# Patient Record
Sex: FEMALE | Race: WHITE | Employment: UNEMPLOYED | URBAN - METROPOLITAN AREA
[De-identification: names, ages, dates, MRNs, and addresses within clinical notes are randomized per-mention and may not be internally consistent; named-entity substitution may affect disease eponyms.]

---

## 2019-06-20 PROBLEM — S82.831B: Status: ACTIVE | Noted: 2019-06-20

## 2019-06-20 PROBLEM — S82.101B: Status: ACTIVE | Noted: 2019-06-20

## 2019-06-20 PROBLEM — S82.401B OPEN FRACTURE OF RIGHT TIBIA AND FIBULA: Status: ACTIVE | Noted: 2019-06-20

## 2019-06-20 PROBLEM — S82.201B OPEN FRACTURE OF RIGHT TIBIA AND FIBULA: Status: ACTIVE | Noted: 2019-06-20

## 2019-06-21 PROBLEM — S82.201B OPEN FRACTURE OF RIGHT FIBULA AND TIBIA: Status: ACTIVE | Noted: 2019-06-20

## 2019-06-21 PROBLEM — S82.401B OPEN FRACTURE OF RIGHT FIBULA AND TIBIA: Status: ACTIVE | Noted: 2019-06-20

## 2021-01-15 ENCOUNTER — TELEPHONE (OUTPATIENT)
Age: 51
End: 2021-01-15

## 2021-01-15 NOTE — TELEPHONE ENCOUNTER
Left message on voicemail reminding patient of appointment on 1/18/21 @ 1:15pm if you have any questions plz contact our office

## 2021-01-18 ENCOUNTER — HOSPITAL ENCOUNTER (OUTPATIENT)
Dept: INFUSION THERAPY | Age: 51
Discharge: HOME | End: 2021-01-18
Payer: COMMERCIAL

## 2021-01-18 ENCOUNTER — OFFICE VISIT (OUTPATIENT)
Age: 51
End: 2021-01-18
Payer: COMMERCIAL

## 2021-01-18 VITALS
SYSTOLIC BLOOD PRESSURE: 125 MMHG | HEIGHT: 63 IN | DIASTOLIC BLOOD PRESSURE: 86 MMHG | TEMPERATURE: 98.2 F | RESPIRATION RATE: 14 BRPM | OXYGEN SATURATION: 96 % | HEART RATE: 90 BPM | WEIGHT: 191 LBS | BODY MASS INDEX: 33.84 KG/M2

## 2021-01-18 VITALS
HEART RATE: 90 BPM | OXYGEN SATURATION: 96 % | SYSTOLIC BLOOD PRESSURE: 125 MMHG | DIASTOLIC BLOOD PRESSURE: 86 MMHG | RESPIRATION RATE: 14 BRPM | TEMPERATURE: 98.2 F

## 2021-01-18 DIAGNOSIS — D72.9 NEUTROPHILIA: ICD-10-CM

## 2021-01-18 DIAGNOSIS — D72.9 NEUTROPHILIA: Primary | ICD-10-CM

## 2021-01-18 DIAGNOSIS — Z72.0 TOBACCO ABUSE: ICD-10-CM

## 2021-01-18 LAB
ALBUMIN SERPL-MCNC: 4 G/DL (ref 3.4–5)
ALBUMIN/GLOB SERPL: 1.1 {RATIO} (ref 0.8–1.7)
ALP SERPL-CCNC: 206 U/L (ref 45–117)
ALT SERPL-CCNC: 29 U/L (ref 13–56)
ANION GAP SERPL CALC-SCNC: 5 MMOL/L (ref 3–18)
AST SERPL-CCNC: 11 U/L (ref 10–38)
BASOPHILS # BLD: 0 K/UL (ref 0–0.1)
BASOPHILS NFR BLD: 0 % (ref 0–2)
BILIRUB SERPL-MCNC: 0.3 MG/DL (ref 0.2–1)
BUN SERPL-MCNC: 18 MG/DL (ref 7–18)
BUN/CREAT SERPL: 24 (ref 12–20)
CALCIUM SERPL-MCNC: 9.8 MG/DL (ref 8.5–10.1)
CHLORIDE SERPL-SCNC: 105 MMOL/L (ref 100–111)
CO2 SERPL-SCNC: 29 MMOL/L (ref 21–32)
CREAT SERPL-MCNC: 0.76 MG/DL (ref 0.6–1.3)
DIFFERENTIAL METHOD BLD: ABNORMAL
EOSINOPHIL # BLD: 0.2 K/UL (ref 0–0.4)
EOSINOPHIL NFR BLD: 1 % (ref 0–5)
ERYTHROCYTE [DISTWIDTH] IN BLOOD BY AUTOMATED COUNT: 13.2 % (ref 11.6–14.5)
FERRITIN SERPL-MCNC: 44 NG/ML (ref 8–388)
FOLATE SERPL-MCNC: 8.4 NG/ML (ref 3.1–17.5)
GLOBULIN SER CALC-MCNC: 3.6 G/DL (ref 2–4)
GLUCOSE SERPL-MCNC: 75 MG/DL (ref 74–99)
HCT VFR BLD AUTO: 47.2 % (ref 35–45)
HGB BLD-MCNC: 14.9 G/DL (ref 12–16)
IRON SATN MFR SERPL: 16 % (ref 20–50)
IRON SERPL-MCNC: 63 UG/DL (ref 50–175)
LYMPHOCYTES # BLD: 2.6 K/UL (ref 0.9–3.6)
LYMPHOCYTES NFR BLD: 24 % (ref 21–52)
MCH RBC QN AUTO: 29.9 PG (ref 24–34)
MCHC RBC AUTO-ENTMCNC: 31.6 G/DL (ref 31–37)
MCV RBC AUTO: 94.8 FL (ref 74–97)
MONOCYTES # BLD: 0.7 K/UL (ref 0.05–1.2)
MONOCYTES NFR BLD: 6 % (ref 3–10)
NEUTS SEG # BLD: 7.4 K/UL (ref 1.8–8)
NEUTS SEG NFR BLD: 69 % (ref 40–73)
PLATELET # BLD AUTO: 438 K/UL (ref 135–420)
PMV BLD AUTO: 10.8 FL (ref 9.2–11.8)
POTASSIUM SERPL-SCNC: 4.5 MMOL/L (ref 3.5–5.5)
PROT SERPL-MCNC: 7.6 G/DL (ref 6.4–8.2)
RBC # BLD AUTO: 4.98 M/UL (ref 4.2–5.3)
SODIUM SERPL-SCNC: 139 MMOL/L (ref 136–145)
TIBC SERPL-MCNC: 400 UG/DL (ref 250–450)
TSH SERPL DL<=0.05 MIU/L-ACNC: 1.53 UIU/ML (ref 0.36–3.74)
VIT B12 SERPL-MCNC: 719 PG/ML (ref 211–911)
WBC # BLD AUTO: 10.8 K/UL (ref 4.6–13.2)

## 2021-01-18 PROCEDURE — 81206 BCR/ABL1 GENE MAJOR BP: CPT

## 2021-01-18 PROCEDURE — 83540 ASSAY OF IRON: CPT

## 2021-01-18 PROCEDURE — 99204 OFFICE O/P NEW MOD 45 MIN: CPT | Performed by: INTERNAL MEDICINE

## 2021-01-18 PROCEDURE — 81270 JAK2 GENE: CPT

## 2021-01-18 PROCEDURE — 84443 ASSAY THYROID STIM HORMONE: CPT

## 2021-01-18 PROCEDURE — 80053 COMPREHEN METABOLIC PANEL: CPT

## 2021-01-18 PROCEDURE — 81219 CALR GENE COM VARIANTS: CPT

## 2021-01-18 PROCEDURE — 82728 ASSAY OF FERRITIN: CPT

## 2021-01-18 PROCEDURE — 36415 COLL VENOUS BLD VENIPUNCTURE: CPT

## 2021-01-18 PROCEDURE — 85025 COMPLETE CBC W/AUTO DIFF WBC: CPT

## 2021-01-18 PROCEDURE — 82746 ASSAY OF FOLIC ACID SERUM: CPT

## 2021-01-18 PROCEDURE — 81402 MOPATH PROCEDURE LEVEL 3: CPT

## 2021-01-18 RX ORDER — SERTRALINE HYDROCHLORIDE 50 MG/1
TABLET, FILM COATED ORAL DAILY
Status: ON HOLD | COMMUNITY
End: 2021-04-13

## 2021-01-18 RX ORDER — SUMATRIPTAN 100 MG/1
100 TABLET, FILM COATED ORAL
COMMUNITY

## 2021-01-18 NOTE — PROGRESS NOTES
[facility]  [address] [place] [address], [address]  [place], [facility]  Office Phone: [phone]  Fax: [phone]      Reason for visit:  Neutrophilia    HPI:   Tima Verdin is a 52 y.o.  female who I was asked to see in consultation at the request of Dr. Ryder Sandy for evaluation for leukocytosis. Labs on 2019 showed WBC 16.4, H&H 13.2/39.6, platelets 302. ANC 13.9. Labs in May 2014 were normal.  Labs on 20 showed WBC 11.2, H&H 14.5/44.4, platelets 475. ANC 7.6  Labs on 10/21/2020 showed WBC 15.5, H&H 14.2/44.7, platelet 795. ANC 14.2  Labs on 12/10/2020 showed WBC 13.1, H&H 14.4/46.4, platelet 980. ANC 8.7, AMC 1.2. I saw and examined patient today. She is awake alert oriented x3. On review of systems she denies any fevers, chills, weight loss, lumps and bumps, or focal neurologic deficit. No drenching night sweats. No repeated infections. Has chronic headache and dizziness. All other point of review of system have been reviewed and were negative. ECOG performance status 0. Independent with ADLs and IADLs. DX   Encounter Diagnosis   Name Primary?    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     GERD (gastroesophageal reflux disease)     reflux    Migraine     Nausea & vomiting     HA    Urinary frequency     Vaginitis and vulvovaginitis     Vitamin D deficiency      Past Surgical History:   Procedure Laterality Date    HX  SECTION      x3    HX ORTHOPAEDIC  2019    bone graft and plate to right fibia, s/p lower leg fracture    HX ORTHOPAEDIC  2019    jodie in her tibia right( both procedures connected)     Social History     Socioeconomic History    Marital status:      Spouse name: Not on file    Number of children: Not on file    Years of education: Not on file    Highest education level: Not on file   Tobacco Use    Smoking status: Current Every Day Smoker     Packs/day: 0.50     Types: Cigarettes    Smokeless tobacco: Never Used    Tobacco comment: less than a half pack per day   Substance and Sexual Activity    Alcohol use: Not Currently     Alcohol/week: 2.0 standard drinks     Types: 2 Glasses of wine per week     Comment: quit in 2006    Drug use: No    Sexual activity: Not Currently     Family History   Problem Relation Age of Onset    Thyroid Disease Mother     High Cholesterol Mother     Cancer Father        Current Outpatient Medications   Medication Sig Dispense Refill    SUMAtriptan (Imitrex) 100 mg tablet Take 100 mg by mouth once as needed for Migraine.  sertraline (Zoloft) 50 mg tablet Take  by mouth daily.  butalbital-aspirin-caffeine (FIORINAL) -40 mg tablet Take 1 Tab by mouth every six (6) hours as needed for Pain (last taken 10/26/20).  gabapentin (NEURONTIN) 300 mg capsule Take 1 Cap by mouth three (3) times daily. Indications: Neuropathic Pain, acute pain following an operation (Patient taking differently: Take 300 mg by mouth two (2) times a day. Indications: neuropathic pain, acute pain following an operation) 90 Cap 0    ibuprofen (MOTRIN) 200 mg tablet Take 600 mg by mouth every eight (8) hours as needed for Pain. Last taken 10/21/20  Indications: pain      ondansetron hcl (ZOFRAN) 4 mg tablet Take 4 mg by mouth every twelve (12) hours as needed for Nausea.  erenumab-aooe (Aimovig Autoinjector) 70 mg/mL injection 70 mg by SubCUTAneous route once. Taken on the 14th of each month      simethicone (MYLICON) 80 mg chewable tablet Take 80 mg by mouth every six (6) hours as needed for Flatulence.  fluticasone (FLONASE) 50 mcg/actuation nasal spray 2 Sprays by Both Nostrils route daily.  1 Bottle 0       No Known Allergies    Review of Systems  As per HPi    Objective:  Physical Exam:  Visit Vitals  /86   Pulse 90   Temp 98.2 °F (36.8 °C) (Oral)   Resp 14   Ht 5' 3\" (1.6 m)   Wt 86.6 kg (191 lb)   SpO2 96%   BMI 33.83 kg/m²       General:  Alert, cooperative, no distress, appears stated age. Head:  Normocephalic, without obvious abnormality, atraumatic. Eyes:  Conjunctivae/corneas clear. PERRL, EOMs intact. Throat: Lips, mucosa, and tongue normal.    Neck: Supple, symmetrical, trachea midline, no adenopathy, thyroid: no enlargement/tenderness/nodules   Back:   Symmetric, no curvature. ROM normal. No CVA tenderness. Lungs:   Clear to auscultation bilaterally. Chest wall:  No tenderness or deformity. Heart:  Regular rate and rhythm, S1, S2 normal, no murmur, click, rub or gallop. Abdomen:   Soft, non-tender. Bowel sounds normal. No masses,  No organomegaly. Extremities: Extremities normal, atraumatic, no cyanosis or edema. Skin: Skin color, texture, turgor normal. No rashes or lesions. Lymph nodes: Cervical, supraclavicular, and axillary nodes normal.   Neurologic: CNII-XII intact. Diagnostic Imaging     Results for orders placed during the hospital encounter of 08/16/20   CT SPINE CERV WO CONT    Narrative Indication: neck pain after head inj. Impression IMPRESSION: No fracture. Comment: CT images of the cervical spine were obtained without intravenous  contrast. This was compared with June 19, 2019. No fracture or dislocation. There is mild reversal the normal cervical curvature. Vertebral body stature  alignment and intervertebral disc space heights are otherwise preserved  throughout. No spinal stenosis or neural foramen encroachment. No prevertebral  soft tissue swelling. DICOM format imaged data is available to non-affiliated external healthcare  facilities or entities on a secure, media free, reciprocally searchable basis  with patient authorization  for 12 months following the date of the study.          Lab Results  Lab Results Component Value Date/Time    WBC 15.5 (H) 10/21/2020 03:00 PM    HGB 14.2 10/21/2020 03:00 PM    HCT 44.7 10/21/2020 03:00 PM    PLATELET 248  03:00 PM    MCV 93.1 10/21/2020 03:00 PM       Lab Results   Component Value Date/Time    Sodium 138 10/21/2020 03:00 PM    Potassium 4.6 10/21/2020 03:00 PM    Chloride 104 10/21/2020 03:00 PM    CO2 28 10/21/2020 03:00 PM    Anion gap 5 10/21/2020 03:00 PM    Glucose 123 (H) 10/21/2020 03:00 PM    BUN 21 10/21/2020 03:00 PM    Creatinine 0.9 10/21/2020 03:00 PM    GFR est AA >60.0 10/21/2020 03:00 PM    GFR est non-AA >60 10/21/2020 03:00 PM    Calcium 9.8 10/21/2020 03:00 PM    Alk. phosphatase 201 (H) 10/21/2020 03:00 PM    Protein, total 7.8 10/21/2020 03:00 PM    Albumin 3.9 10/21/2020 03:00 PM    ALT (SGPT) 49 10/21/2020 03:00 PM     Follow-up with PCP for health maintenance  Assessment/Plan:  52 y.o. female with   1. Neutrophilia  I Had a long discussion with patient regarding her neutrophilia. She has no repeated infections. Not on steroids. She smokes 1/2 cigarette per day. Her father  from acute myeloid leukemia. I told that her neutrophilia is likely reactive from cigarette smoking however I will do w/u and r/o MPN as below:  - CBC WITH AUTOMATED DIFF; Future  - FERRITIN; Future  - IRON PROFILE; Future  - METABOLIC PANEL, COMPREHENSIVE; Future  - VITAMIN B12 & FOLATE; Future  - JAK2 MUTATION ANALYSIS; Future  - MPL MUTATION ANALYSIS; Future  - CALR MUTATION ANALYSIS; Future  - PBS  - TSH    2. Tobacco abuse: Tobacco cessation counseling done. She will talk to PCP to try Tarrs Raw    Thank you  for referring Mrs. Rafa Posadas    CC:      Return in 2 weeks

## 2021-01-18 NOTE — PROGRESS NOTES
[facility] Progress Note    Date: 2021    Name: Marimar Fontaine    MRN: [id number]         : 1970    Peripheral Lab Draw      Ms. Fontaine to [facility], ambulatory at 1525 accompanied by self. Pt was assessed and education was provided.     Ms. Fontaine's vitals were reviewed and patient was observed for 5 minutes prior to treatment.   Visit Vitals  /86   Pulse 90   Temp 98.2 °F (36.8 °C) (Oral)   Resp 14   SpO2 96%       Blood obtained peripherally from left arm x 1 attempt with butterfly needle and sent to lab for Cbc w/diff, Cmp, Iron Profile, Ferritin, Tsh, Peripheral smear, Calr, Mpl and Jak2 Mutation and BCR-abl1,  per written orders. No bleeding or hematoma noted at site. Gauze and coban applied.     Ms. Fontaine tolerated the phlebotomy, and had no complaints.  Patient armband removed and shredded.    [facility] at 1535.     Anjana Patel Phlebotomist PCT  2021  3:33 PM

## 2021-01-18 NOTE — LETTER
1/18/2021 Patient: Mica Arnold YOB: 1970 Date of Visit: 1/18/2021 Hilda Kaba MD 
[address] [facility] [address] [address] Via Fax: [phone] Dear Hilda Kaba MD, Thank you for referring Ms. Mica Arnold to [address] [place] [address] for evaluation. My notes for this consultation are attached. If you have questions, please do not hesitate to call me. I look forward to following your patient along with you.  
 
 
Sincerely, 
 
Billy Cooper MD

## 2021-01-19 LAB — PERIPHERAL SMEAR,PSM: NORMAL

## 2021-01-22 LAB
BACKGROUND, CALR2T: NORMAL
CALR MUTATION DETECTION RESULT, CALR1T: NORMAL
LAB DIRECTOR NAME PROVIDER: NORMAL
REF LAB TEST METHOD: NORMAL
REFERENCES, CALR4T: NORMAL

## 2021-01-25 LAB
BACKGROUND, BCR6T: NORMAL
BACKGROUND: 489207: NORMAL
DIRECTOR REVIEW: 489204: NORMAL
INTERPRETATION: 480488: NEGATIVE
JAK2 P.V617F BLD/T QL: NORMAL
LAB DIRECTOR NAME PROVIDER: NORMAL
T(ABL1,BCR)B2A2/CONTROL BLD/T: NORMAL %
T(ABL1,BCR)B2A2/CONTROL BLD/T: NORMAL %
T(ABL1,BCR)B3A2/CONTROL BLD/T: NORMAL %
T(ABL1,BCR)E1A2/CONTROL BLD/T: NORMAL %

## 2021-01-26 PROBLEM — D50.9 IRON DEFICIENCY ANEMIA: Status: ACTIVE | Noted: 2021-01-26

## 2021-01-26 RX ORDER — ALBUTEROL SULFATE 0.83 MG/ML
2.5 SOLUTION RESPIRATORY (INHALATION) AS NEEDED
Status: CANCELLED
Start: 2021-01-29

## 2021-01-26 RX ORDER — ONDANSETRON 2 MG/ML
8 INJECTION INTRAMUSCULAR; INTRAVENOUS AS NEEDED
Status: CANCELLED | OUTPATIENT
Start: 2021-01-29

## 2021-01-26 RX ORDER — HYDROCORTISONE SODIUM SUCCINATE 100 MG/2ML
100 INJECTION, POWDER, FOR SOLUTION INTRAMUSCULAR; INTRAVENOUS AS NEEDED
Status: CANCELLED | OUTPATIENT
Start: 2021-01-29

## 2021-01-26 RX ORDER — EPINEPHRINE 1 MG/ML
0.3 INJECTION, SOLUTION, CONCENTRATE INTRAVENOUS AS NEEDED
Status: CANCELLED | OUTPATIENT
Start: 2021-01-29

## 2021-01-26 RX ORDER — DIPHENHYDRAMINE HYDROCHLORIDE 50 MG/ML
25 INJECTION, SOLUTION INTRAMUSCULAR; INTRAVENOUS AS NEEDED
Status: CANCELLED
Start: 2021-01-29

## 2021-01-26 RX ORDER — DIPHENHYDRAMINE HYDROCHLORIDE 50 MG/ML
50 INJECTION, SOLUTION INTRAMUSCULAR; INTRAVENOUS AS NEEDED
Status: CANCELLED
Start: 2021-01-29

## 2021-01-26 RX ORDER — HEPARIN 100 UNIT/ML
300-500 SYRINGE INTRAVENOUS AS NEEDED
Status: CANCELLED
Start: 2021-01-29

## 2021-01-26 RX ORDER — ACETAMINOPHEN 325 MG/1
650 TABLET ORAL AS NEEDED
Status: CANCELLED
Start: 2021-01-29

## 2021-01-26 RX ORDER — SODIUM CHLORIDE 9 MG/ML
10 INJECTION INTRAMUSCULAR; INTRAVENOUS; SUBCUTANEOUS AS NEEDED
Status: CANCELLED | OUTPATIENT
Start: 2021-01-29

## 2021-01-27 ENCOUNTER — APPOINTMENT (OUTPATIENT)
Dept: PHYSICAL THERAPY | Age: 51
End: 2021-01-27

## 2021-01-29 ENCOUNTER — HOSPITAL ENCOUNTER (OUTPATIENT)
Dept: INFUSION THERAPY | Age: 51
Discharge: HOME | End: 2021-01-29
Payer: COMMERCIAL

## 2021-01-29 VITALS
HEIGHT: 63 IN | BODY MASS INDEX: 34.21 KG/M2 | DIASTOLIC BLOOD PRESSURE: 80 MMHG | SYSTOLIC BLOOD PRESSURE: 116 MMHG | OXYGEN SATURATION: 95 % | HEART RATE: 73 BPM | TEMPERATURE: 97.5 F | WEIGHT: 193.1 LBS | RESPIRATION RATE: 18 BRPM

## 2021-01-29 DIAGNOSIS — D50.8 OTHER IRON DEFICIENCY ANEMIA: Primary | ICD-10-CM

## 2021-01-29 PROCEDURE — 74011250636 HC RX REV CODE- 250/636: Performed by: INTERNAL MEDICINE

## 2021-01-29 PROCEDURE — 96365 THER/PROPH/DIAG IV INF INIT: CPT

## 2021-01-29 RX ORDER — SODIUM CHLORIDE 9 MG/ML
25 INJECTION, SOLUTION INTRAVENOUS CONTINUOUS
Status: CANCELLED | OUTPATIENT
Start: 2021-02-05

## 2021-01-29 RX ORDER — EPINEPHRINE 1 MG/ML
0.3 INJECTION, SOLUTION, CONCENTRATE INTRAVENOUS AS NEEDED
Status: CANCELLED | OUTPATIENT
Start: 2021-02-05

## 2021-01-29 RX ORDER — SODIUM CHLORIDE 9 MG/ML
25 INJECTION, SOLUTION INTRAVENOUS CONTINUOUS
Status: DISPENSED | OUTPATIENT
Start: 2021-01-29 | End: 2021-01-29

## 2021-01-29 RX ORDER — ACETAMINOPHEN 325 MG/1
650 TABLET ORAL AS NEEDED
Status: CANCELLED
Start: 2021-02-05

## 2021-01-29 RX ORDER — ALBUTEROL SULFATE 0.83 MG/ML
2.5 SOLUTION RESPIRATORY (INHALATION) AS NEEDED
Status: CANCELLED
Start: 2021-02-05

## 2021-01-29 RX ORDER — SODIUM CHLORIDE 0.9 % (FLUSH) 0.9 %
10 SYRINGE (ML) INJECTION AS NEEDED
Status: CANCELLED | OUTPATIENT
Start: 2021-02-05

## 2021-01-29 RX ORDER — DIPHENHYDRAMINE HYDROCHLORIDE 50 MG/ML
25 INJECTION, SOLUTION INTRAMUSCULAR; INTRAVENOUS AS NEEDED
Status: CANCELLED
Start: 2021-02-05

## 2021-01-29 RX ORDER — SODIUM CHLORIDE 0.9 % (FLUSH) 0.9 %
10 SYRINGE (ML) INJECTION AS NEEDED
Status: DISPENSED | OUTPATIENT
Start: 2021-01-29 | End: 2021-01-29

## 2021-01-29 RX ORDER — SODIUM CHLORIDE 9 MG/ML
10 INJECTION INTRAMUSCULAR; INTRAVENOUS; SUBCUTANEOUS AS NEEDED
Status: CANCELLED | OUTPATIENT
Start: 2021-02-05

## 2021-01-29 RX ORDER — ONDANSETRON 2 MG/ML
8 INJECTION INTRAMUSCULAR; INTRAVENOUS AS NEEDED
Status: CANCELLED | OUTPATIENT
Start: 2021-02-05

## 2021-01-29 RX ORDER — HEPARIN 100 UNIT/ML
300-500 SYRINGE INTRAVENOUS AS NEEDED
Status: CANCELLED
Start: 2021-02-05

## 2021-01-29 RX ORDER — HYDROCORTISONE SODIUM SUCCINATE 100 MG/2ML
100 INJECTION, POWDER, FOR SOLUTION INTRAMUSCULAR; INTRAVENOUS AS NEEDED
Status: CANCELLED | OUTPATIENT
Start: 2021-02-05

## 2021-01-29 RX ORDER — DIPHENHYDRAMINE HYDROCHLORIDE 50 MG/ML
50 INJECTION, SOLUTION INTRAMUSCULAR; INTRAVENOUS AS NEEDED
Status: CANCELLED
Start: 2021-02-05

## 2021-01-29 RX ADMIN — Medication 10 ML: at 10:52

## 2021-01-29 RX ADMIN — FERRIC CARBOXYMALTOSE INJECTION 750 MG: 50 INJECTION, SOLUTION INTRAVENOUS at 11:07

## 2021-01-29 RX ADMIN — SODIUM CHLORIDE 25 ML/HR: 9 INJECTION, SOLUTION INTRAVENOUS at 11:07

## 2021-01-29 RX ADMIN — SODIUM CHLORIDE 25 ML/HR: 9 INJECTION, SOLUTION INTRAVENOUS at 10:55

## 2021-01-29 NOTE — PROGRESS NOTES
[facility] Progress Note    Date: 2021    Name: Chad Mcgowan    MRN: [id number]         : 1970    Injectafer Infusion (1 of 2)    Ms. Emma David to [facility], ambulatory, and in no acute distress at 1025. Patient presents today for first of two Injectafer infusions, as ordered. Pt was assessed and education was provided. Procedure was explained to patient and s/s of potential allergic reaction/side effects of Injectafer, were discussed with patient. Patient was encouraged to verbalize questions/concerns regarding first dose of medication. Patient's questions were answered to her satisfaction and she denied concerns, at this time. Ms. Lan Dai vitals were reviewed and patient was observed for 5 minutes prior to treatment. Visit Vitals  /73 (BP 1 Location: Left upper arm, BP Patient Position: Sitting)   Pulse 82   Temp 98 °F (36.7 °C)   Resp 18   Ht 5' 3\" (1.6 m)   Wt 87.6 kg (193 lb 1.6 oz)   SpO2 95%   Breastfeeding No   BMI 34.21 kg/m²     IV site established on first attempt with insertion of 24g PIV in metacarpal vein of posterior left hand. Brisk blood return was noted and catheter flushed easily with 10 mL NS. Patient denied complaints and IV site without evidence of complication. 250 mL bag of NS IV was initiated via peripheral IV line. No s/s irritation or infiltration noted. Injectafer 750mg/250 mL NS IV was initiated @ 750 mL/hr, to infuse over 20 minutes, as ordered. 5 minutes into infusion, VS stable and pt denied complaints of itching, lip/tongue/facial swelling, SOB, CP or other complaints. Infusion completed after approximately 20 minutes. Ms. Emma David tolerated the infusion, and had no complaints. Patient observed in OPIC x 30 minutes, post completion of infusion. VS remained stable. No adverse reaction was noted.     Patient Vitals for the past 12 hrs:   Temp Pulse Resp BP SpO2   21 1200 97.5 °F (36.4 °C) 73 18 116/80 --   21 1129 97.7 °F (36.5 °C) 80 18 111/73 95 %   01/29/21 1041 98 °F (36.7 °C) 82 18 116/73 95 %     PIV flushed with 10 mL NS and site d/cd with catheter removed intact. No bleeding, hematoma or other evidence of complication noted at site. Gauze dressing applied to site and pressure held x 2 minutes, then wrapped with coban. Reviewed discharge instructions with patient, including expected side effects (abdominal cramping, nausea, changes in color of urine or feces) and signs of allergic reaction requiring medical attention (itching/hives/rashes, SOB, chest pain, lip/tongue/facial swelling). Patient given printed copy to take home. Patient verbalized understanding of discharge instructions. Patient armband removed and shredded. Ms. Linda Estrada was discharged from [place] [address] in stable condition at 1210. She is to return on 02/05/2021 at 1400 for her next appointment. Patient is aware of her upcoming scheduled appointment.     Perez Gautam RN  January 29, 2021  1:08 PM

## 2021-02-01 ENCOUNTER — VIRTUAL VISIT (OUTPATIENT)
Age: 51
End: 2021-02-01
Payer: COMMERCIAL

## 2021-02-01 DIAGNOSIS — D72.9 NEUTROPHILIA: Primary | ICD-10-CM

## 2021-02-01 DIAGNOSIS — D75.839 THROMBOCYTOSIS: ICD-10-CM

## 2021-02-01 DIAGNOSIS — D50.8 OTHER IRON DEFICIENCY ANEMIA: ICD-10-CM

## 2021-02-01 DIAGNOSIS — Z72.0 TOBACCO ABUSE: ICD-10-CM

## 2021-02-01 PROCEDURE — 99443 PR PHYS/QHP TELEPHONE EVALUATION 21-30 MIN: CPT | Performed by: INTERNAL MEDICINE

## 2021-02-01 NOTE — PROGRESS NOTES
Mary Ellen Ram is a 52 y.o. female who was seen by synchronous (real-time) audio- technology on 2021. Assessment & Plan:   Diagnoses and all orders for this visit:    1. Neutrophilia    2. Other iron deficiency anemia    3. Tobacco abuse    4. Thrombocytosis ([address]        The complexity of medical decision making for this visit is moderate       1. Neutrophilia  I Had a long discussion with patient regarding her neutrophilia. She has no repeated infections. Not on steroids. She smokes 1/2 cigarette per day. Her father  from acute myeloid leukemia. I told that her neutrophilia is likely reactive from cigarette smoking. Myeloproliferative neoplasm work-up is negative. Labs on 2020 showed ferritin 44, transferrin saturation 16%, BUN 18, creatinine 0.76. Normal B12 and folate. JAK2, BCRABL, MPL and SOPHIE R are all negative. WBC 10.8, H&H 14.9/47.2, platelets 817. *Advised patient to stop smoking  *No further investigations needed. 2.  Iron deficiency: Started iron replacement with Injectafer /2 on 2021  *Needs GI for colonoscopy-One is due in 2021  *Check celiac panel before next visit     2. Tobacco abuse: Tobacco cessation counseling done. She will talk to PCP to try Wellbrutin    3. Thrombocytosis: Likely secondary to iron deficiency. I would expect this to resolve after IV iron infusion.     Thank you  for referring Mrs. Fontaine     CC:    RTC 3 months     I spent at least 22 minutes on this visit with this established patient. 712  Subjective: Mary Ellen Ram is a 52 y.o.  female who I was asked to see in consultation at the request of Dr. Kiran Sahu for evaluation for leukocytosis. I saw patient on 2020 and she is here for follow-up.     I saw and examined patient today. She is awake alert oriented x3. On review of systems she denies any fevers, chills, weight loss, lumps and bumps, or focal neurologic deficit. No drenching night sweats.   No repeated infections. Has chronic headache and dizziness. All other point of review of system have been reviewed and were negative. ECOG performance status 0. Independent with ADLs and IADLs.      Prior to Admission medications    Medication Sig Start Date End Date Taking? Authorizing Provider   SUMAtriptan (Imitrex) 100 mg tablet Take 100 mg by mouth once as needed for Migraine. Yes Provider, Historical   sertraline (Zoloft) 50 mg tablet Take  by mouth daily. Yes Provider, Historical   erenumab-aooe (Aimovig Autoinjector) 70 mg/mL injection 70 mg by SubCUTAneous route once. Taken on the 14th of each month   Yes Provider, Historical   butalbital-aspirin-caffeine [facility]) -40 mg tablet Take 1 Tab by mouth every six (6) hours as needed for Pain (last taken 10/26/20). Yes Other, MD Vincent   gabapentin (NEURONTIN) 300 mg capsule Take 1 Cap by mouth three (3) times daily. Indications: Neuropathic Pain, acute pain following an operation  Patient taking differently: Take 300 mg by mouth two (2) times a day. Indications: neuropathic pain, acute pain following an operation 6/21/19  Yes Shun Pendleton DO   ibuprofen (MOTRIN) 200 mg tablet Take 600 mg by mouth every eight (8) hours as needed for Pain. Last taken 10/21/20  Indications: pain 6/21/19  Yes Shun Pendleton DO   ondansetron hcl (ZOFRAN) 4 mg tablet Take 4 mg by mouth every twelve (12) hours as needed for Nausea. Yes Chance, MD Vincent   simethicone (MYLICON) 80 mg chewable tablet Take 80 mg by mouth every six (6) hours as needed for Flatulence. Yes Other, MD Vincent   fluticasone (FLONASE) 50 mcg/actuation nasal spray 2 Sprays by Both Nostrils route daily.  8/13/18  Yes Saray President, NP     Patient Active Problem List   Diagnosis Code    Depression F32.9    Urinary frequency R35.0    Nocturia R35.1    Pelvic pain affecting pregnancy O26.899, R10.2    Vaginitis N76.0    Incomplete emptying of bladder R33.9    Open fracture of right fibula and tibia S82.201B, S82.401B    Open fracture of right tibia and fibula S82.201B, S82.401B    Neutrophilia D72.9    Tobacco abuse Z72.0    Iron deficiency anemia D50.9    Thrombocytosis (HCC) D47.3     Patient Active Problem List    Diagnosis Date Noted    Thrombocytosis ([facility] [address] 02/01/2021    Iron deficiency anemia 01/26/2021    Neutrophilia 01/18/2021    Tobacco abuse 01/18/2021    Open fracture of right fibula and tibia 06/20/2019    Open fracture of right tibia and fibula 06/20/2019    Urinary frequency 05/12/2015    Nocturia 05/12/2015    Pelvic pain affecting pregnancy 05/12/2015    Vaginitis 05/12/2015    Incomplete emptying of bladder 05/12/2015    Depression 11/16/2012     Current Outpatient Medications   Medication Sig Dispense Refill    SUMAtriptan (Imitrex) 100 mg tablet Take 100 mg by mouth once as needed for Migraine.  sertraline (Zoloft) 50 mg tablet Take  by mouth daily.  erenumab-aooe (Aimovig Autoinjector) 70 mg/mL injection 70 mg by SubCUTAneous route once. Taken on the 14th of each month      butalbital-aspirin-caffeine [facility]) -40 mg tablet Take 1 Tab by mouth every six (6) hours as needed for Pain (last taken 10/26/20).  gabapentin (NEURONTIN) 300 mg capsule Take 1 Cap by mouth three (3) times daily. Indications: Neuropathic Pain, acute pain following an operation (Patient taking differently: Take 300 mg by mouth two (2) times a day. Indications: neuropathic pain, acute pain following an operation) 90 Cap 0    ibuprofen (MOTRIN) 200 mg tablet Take 600 mg by mouth every eight (8) hours as needed for Pain. Last taken 10/21/20  Indications: pain      ondansetron hcl (ZOFRAN) 4 mg tablet Take 4 mg by mouth every twelve (12) hours as needed for Nausea.  simethicone (MYLICON) 80 mg chewable tablet Take 80 mg by mouth every six (6) hours as needed for Flatulence.       fluticasone (FLONASE) 50 mcg/actuation nasal spray 2 Sprays by Both Nostrils route daily. 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     GERD (gastroesophageal reflux disease)     reflux    Migraine     Nausea & vomiting     HA    Urinary frequency     Vaginitis and vulvovaginitis     Vitamin D deficiency      Past Surgical History:   Procedure Laterality Date    HX  SECTION      x3    HX ORTHOPAEDIC  2019    bone graft and plate to right fibia, s/p lower leg fracture    HX ORTHOPAEDIC  2019    jodie in her tibia right( both procedures connected)     Family History   Problem Relation Age of Onset    Thyroid Disease Mother     High Cholesterol Mother     Cancer Father      Social History     Tobacco Use    Smoking status: Current Every Day Smoker     Packs/day: 0.50     Types: Cigarettes    Smokeless tobacco: Never Used    Tobacco comment: less than a half pack per day   Substance Use Topics    Alcohol use: Not Currently     Alcohol/week: 2.0 standard drinks     Types: 2 Glasses of wine per week     Comment: quit in        ROS: As per HPI    Objective:   No flowsheet data found. General: alert, cooperative, no distress     Additional exam findings: We discussed the expected course, resolution and complications of the diagnosis(es) in detail. Medication risks, benefits, costs, interactions, and alternatives were discussed as indicated. I advised her to contact the office if her condition worsens, changes or fails to improve as anticipated. She expressed understanding with the diagnosis(es) and plan. Guille Hess, who was evaluated through a patient-initiated, synchronous (real-time) audio-encounter, and/or her healthcare decision maker, is aware that it is a billable service, with coverage as determined by her insurance carrier.  She provided verbal consent to proceed: Yes, and patient identification was verified. It was conducted pursuant to the emergency declaration under the [facility] [address] and the [employer] and [employer] General Act. A caregiver was present when appropriate. Ability to conduct physical exam was limited. I was at home. The patient was at home.       Fede Gan MD

## 2021-02-03 ENCOUNTER — APPOINTMENT (OUTPATIENT)
Dept: PHYSICAL THERAPY | Age: 51
End: 2021-02-03

## 2021-02-05 ENCOUNTER — HOSPITAL ENCOUNTER (OUTPATIENT)
Dept: INFUSION THERAPY | Age: 51
Discharge: HOME | End: 2021-02-05
Payer: COMMERCIAL

## 2021-02-05 VITALS
RESPIRATION RATE: 18 BRPM | DIASTOLIC BLOOD PRESSURE: 74 MMHG | TEMPERATURE: 97 F | SYSTOLIC BLOOD PRESSURE: 106 MMHG | OXYGEN SATURATION: 94 % | HEART RATE: 91 BPM

## 2021-02-05 DIAGNOSIS — D50.8 OTHER IRON DEFICIENCY ANEMIA: Primary | ICD-10-CM

## 2021-02-05 PROCEDURE — 74011250636 HC RX REV CODE- 250/636: Performed by: INTERNAL MEDICINE

## 2021-02-05 PROCEDURE — 96365 THER/PROPH/DIAG IV INF INIT: CPT

## 2021-02-05 RX ORDER — SODIUM CHLORIDE 9 MG/ML
25 INJECTION, SOLUTION INTRAVENOUS CONTINUOUS
Status: DISCONTINUED | OUTPATIENT
Start: 2021-02-05 | End: 2021-02-06 | Stop reason: HOSPADM

## 2021-02-05 RX ORDER — ONDANSETRON 2 MG/ML
8 INJECTION INTRAMUSCULAR; INTRAVENOUS AS NEEDED
Status: CANCELLED | OUTPATIENT
Start: 2021-02-12

## 2021-02-05 RX ORDER — SODIUM CHLORIDE 9 MG/ML
10 INJECTION INTRAMUSCULAR; INTRAVENOUS; SUBCUTANEOUS AS NEEDED
Status: CANCELLED | OUTPATIENT
Start: 2021-02-12

## 2021-02-05 RX ORDER — EPINEPHRINE 1 MG/ML
0.3 INJECTION, SOLUTION, CONCENTRATE INTRAVENOUS AS NEEDED
Status: CANCELLED | OUTPATIENT
Start: 2021-02-12

## 2021-02-05 RX ORDER — HEPARIN 100 UNIT/ML
300-500 SYRINGE INTRAVENOUS AS NEEDED
Status: CANCELLED
Start: 2021-02-12

## 2021-02-05 RX ORDER — DIPHENHYDRAMINE HYDROCHLORIDE 50 MG/ML
50 INJECTION, SOLUTION INTRAMUSCULAR; INTRAVENOUS AS NEEDED
Status: CANCELLED
Start: 2021-02-12

## 2021-02-05 RX ORDER — ACETAMINOPHEN 325 MG/1
650 TABLET ORAL AS NEEDED
Status: CANCELLED
Start: 2021-02-12

## 2021-02-05 RX ORDER — DIPHENHYDRAMINE HYDROCHLORIDE 50 MG/ML
25 INJECTION, SOLUTION INTRAMUSCULAR; INTRAVENOUS AS NEEDED
Status: CANCELLED
Start: 2021-02-12

## 2021-02-05 RX ORDER — SODIUM CHLORIDE 9 MG/ML
25 INJECTION, SOLUTION INTRAVENOUS CONTINUOUS
Status: CANCELLED | OUTPATIENT
Start: 2021-02-12

## 2021-02-05 RX ORDER — HYDROCORTISONE SODIUM SUCCINATE 100 MG/2ML
100 INJECTION, POWDER, FOR SOLUTION INTRAMUSCULAR; INTRAVENOUS AS NEEDED
Status: CANCELLED | OUTPATIENT
Start: 2021-02-12

## 2021-02-05 RX ORDER — SODIUM CHLORIDE 0.9 % (FLUSH) 0.9 %
10 SYRINGE (ML) INJECTION AS NEEDED
Status: CANCELLED | OUTPATIENT
Start: 2021-02-12

## 2021-02-05 RX ORDER — ALBUTEROL SULFATE 0.83 MG/ML
2.5 SOLUTION RESPIRATORY (INHALATION) AS NEEDED
Status: CANCELLED
Start: 2021-02-12

## 2021-02-05 RX ORDER — SODIUM CHLORIDE 9 MG/ML
10 INJECTION INTRAMUSCULAR; INTRAVENOUS; SUBCUTANEOUS AS NEEDED
Status: DISCONTINUED | OUTPATIENT
Start: 2021-02-05 | End: 2021-02-06 | Stop reason: HOSPADM

## 2021-02-05 RX ADMIN — SODIUM CHLORIDE 25 ML/HR: 0.9 INJECTION, SOLUTION INTRAVENOUS at 14:35

## 2021-02-05 RX ADMIN — SODIUM CHLORIDE 10 ML: 9 INJECTION INTRAMUSCULAR; INTRAVENOUS; SUBCUTANEOUS at 15:02

## 2021-02-05 RX ADMIN — FERRIC CARBOXYMALTOSE INJECTION 750 MG: 50 INJECTION, SOLUTION INTRAVENOUS at 14:35

## 2021-02-05 NOTE — PROGRESS NOTES
[facility] Progress Note    Date: 2021    Name: Jeanne Schreiber    MRN: [id number]         : 1970    Injectafer Infusion (2 of 2)    Ms. Willoughby to [facility], ambulatory, and in no acute distress at 1420. Patient presents today for first of two Injectafer infusions, as ordered. Pt was assessed and education was provided. Procedure was explained to patient and s/s of potential allergic reaction/side effects of Injectafer, were discussed with patient. Patient was encouraged to verbalize questions/concerns regarding first dose of medication. Patient's questions were answered to her satisfaction and she denied concerns, at this time. Ms. Campos How vitals were reviewed and patient was observed for 5 minutes prior to treatment. Visit Vitals  /74 (BP 1 Location: Right upper arm, BP Patient Position: Sitting)   Pulse 91   Temp 97 °F (36.1 °C)   Resp 18   SpO2 94%     22g PIV initiated in left antecubital x1 attempt. Brisk blood return was noted and catheter flushed easily with 10mL NS. Patient denied complaints and IV site without evidence of complication. Injectafer 750mg/250 mL NS IV was initiated @ 750 mL/hr, to infuse over 20 minutes, as ordered. 5 minutes into infusion, VS stable and pt denied complaints of itching, lip/tongue/facial swelling, SOB, CP or other complaints. Infusion completed after approximately 20 minutes. Ms. Willoughby tolerated the infusion, and had no complaints. Patient observed in OPIC x 30 minutes, post completion of infusion. VS remained stable. No adverse reaction was noted. Patient Vitals for the past 12 hrs:   Temp Pulse Resp BP SpO2   21 1420 97 °F (36.1 °C) 91 18 106/74 94 %     PIV flushed with 10mL NS and removed. No bleeding, hematoma or other evidence of complication noted at site. Gauze dressing applied to site and pressure held x 2 minutes, then wrapped with coban. Patient armband removed and shredded.     Ms. Willoughby was discharged from [address] in stable condition at 1505. She is to follow-up with referring provider for next appointment.     Rosales Cronin RN  February 5, 2021  2465

## 2021-02-10 ENCOUNTER — APPOINTMENT (OUTPATIENT)
Dept: PHYSICAL THERAPY | Age: 51
End: 2021-02-10

## 2021-02-18 PROBLEM — N73.6 FEMALE PELVIC PERITONEAL ADHESIONS: Status: ACTIVE | Noted: 2021-02-18

## 2021-02-18 PROBLEM — F41.1 ANXIETY STATE: Status: ACTIVE | Noted: 2021-02-18

## 2021-02-18 PROBLEM — O34.219 UTERINE SCAR FROM PREVIOUS CESAREAN DELIVERY, WITH DELIVERY: Status: ACTIVE | Noted: 2021-02-18

## 2021-02-18 PROBLEM — N93.8 DYSFUNCTIONAL UTERINE BLEEDING: Status: ACTIVE | Noted: 2021-02-18

## 2021-02-18 PROBLEM — R10.9 ABDOMINAL PAIN: Status: ACTIVE | Noted: 2021-02-18

## 2021-02-18 PROBLEM — R68.89 GENERAL SYMPTOM: Status: ACTIVE | Noted: 2021-02-18

## 2021-02-18 PROBLEM — N94.9 FEMALE GENITAL SYMPTOMS: Status: ACTIVE | Noted: 2021-02-18

## 2021-02-18 PROBLEM — N94.3 PREMENSTRUAL TENSION SYNDROME: Status: ACTIVE | Noted: 2021-02-18

## 2021-02-18 PROBLEM — N83.209 CYST OF OVARY: Status: ACTIVE | Noted: 2021-02-18

## 2021-02-18 PROBLEM — D64.9 ANEMIA: Status: ACTIVE | Noted: 2021-02-18

## 2021-02-18 PROBLEM — E55.9 VITAMIN D DEFICIENCY: Status: ACTIVE | Noted: 2021-02-18

## 2021-02-18 PROBLEM — G93.32 CHRONIC FATIGUE SYNDROME: Status: ACTIVE | Noted: 2021-02-18

## 2021-02-18 PROBLEM — N94.0 MITTELSCHMERZ: Status: ACTIVE | Noted: 2021-02-18

## 2021-02-24 ENCOUNTER — APPOINTMENT (OUTPATIENT)
Dept: PHYSICAL THERAPY | Age: 51
End: 2021-02-24

## 2021-02-26 LAB
LAB DIRECTOR NAME PROVIDER: NORMAL
MPL GENE MUT TESTED BLD/T: NORMAL
MPL P.W515L+W515K+S505N BLD/T QL: NORMAL
REFERENCES, MPLM6T: NORMAL
SERVICE CMNT-IMP: NORMAL

## 2021-04-13 PROBLEM — R10.2 PELVIC PAIN: Status: ACTIVE | Noted: 2021-04-13

## 2021-05-03 ENCOUNTER — TELEPHONE (OUTPATIENT)
Age: 51
End: 2021-05-03

## 2021-05-17 ENCOUNTER — TELEPHONE (OUTPATIENT)
Age: 51
End: 2021-05-17

## 2021-05-18 ENCOUNTER — TELEPHONE (OUTPATIENT)
Age: 51
End: 2021-05-18

## 2021-05-18 NOTE — TELEPHONE ENCOUNTER
Patient called to reschedule appointment. Patient also stated that she was suppose to have labs ordered, she would like them faxed to Tracy Armenta at [place].  The fax number is [phone]

## 2021-05-19 DIAGNOSIS — D72.9 NEUTROPHILIA: Primary | ICD-10-CM

## 2021-05-19 DIAGNOSIS — D50.8 OTHER IRON DEFICIENCY ANEMIA: ICD-10-CM

## 2021-05-19 DIAGNOSIS — D75.839 THROMBOCYTOSIS: ICD-10-CM

## 2021-05-26 LAB
ALBUMIN SERPL-MCNC: 4.7 G/DL (ref 3.8–4.8)
ALBUMIN/GLOB SERPL: 1.7 {RATIO} (ref 1.2–2.2)
ALP SERPL-CCNC: 171 IU/L (ref 48–121)
ALT SERPL-CCNC: 20 IU/L (ref 0–32)
AST SERPL-CCNC: 16 IU/L (ref 0–40)
BASOPHILS # BLD AUTO: 0.1 X10E3/UL (ref 0–0.2)
BASOPHILS NFR BLD AUTO: 1 %
BILIRUB SERPL-MCNC: <0.2 MG/DL (ref 0–1.2)
BUN SERPL-MCNC: 13 MG/DL (ref 6–24)
BUN/CREAT SERPL: 17 (ref 9–23)
CALCIUM SERPL-MCNC: 10.5 MG/DL (ref 8.7–10.2)
CHLORIDE SERPL-SCNC: 104 MMOL/L (ref 96–106)
CO2 SERPL-SCNC: 23 MMOL/L (ref 20–29)
CREAT SERPL-MCNC: 0.77 MG/DL (ref 0.57–1)
EOSINOPHIL # BLD AUTO: 0 X10E3/UL (ref 0–0.4)
EOSINOPHIL NFR BLD AUTO: 0 %
ERYTHROCYTE [DISTWIDTH] IN BLOOD BY AUTOMATED COUNT: 12.3 % (ref 11.7–15.4)
FERRITIN SERPL-MCNC: 977 NG/ML (ref 15–150)
FOLATE SERPL-MCNC: 4.6 NG/ML
GLOBULIN SER CALC-MCNC: 2.8 G/DL (ref 1.5–4.5)
GLUCOSE SERPL-MCNC: 101 MG/DL (ref 65–99)
HCT VFR BLD AUTO: 45.2 % (ref 34–46.6)
HGB BLD-MCNC: 14.9 G/DL (ref 11.1–15.9)
IMM GRANULOCYTES # BLD AUTO: 0.1 X10E3/UL (ref 0–0.1)
IMM GRANULOCYTES NFR BLD AUTO: 1 %
IRON SATN MFR SERPL: 21 % (ref 15–55)
IRON SERPL-MCNC: 65 UG/DL (ref 27–159)
LYMPHOCYTES # BLD AUTO: 1.1 X10E3/UL (ref 0.7–3.1)
LYMPHOCYTES NFR BLD AUTO: 9 %
MCH RBC QN AUTO: 30 PG (ref 26.6–33)
MCHC RBC AUTO-ENTMCNC: 33 G/DL (ref 31.5–35.7)
MCV RBC AUTO: 91 FL (ref 79–97)
MONOCYTES # BLD AUTO: 0.3 X10E3/UL (ref 0.1–0.9)
MONOCYTES NFR BLD AUTO: 3 %
NEUTROPHILS # BLD AUTO: 10.5 X10E3/UL (ref 1.4–7)
NEUTROPHILS NFR BLD AUTO: 86 %
PLATELET # BLD AUTO: 456 X10E3/UL (ref 150–450)
POTASSIUM SERPL-SCNC: 4.6 MMOL/L (ref 3.5–5.2)
PROT SERPL-MCNC: 7.5 G/DL (ref 6–8.5)
RBC # BLD AUTO: 4.96 X10E6/UL (ref 3.77–5.28)
SODIUM SERPL-SCNC: 143 MMOL/L (ref 134–144)
TIBC SERPL-MCNC: 314 UG/DL (ref 250–450)
UIBC SERPL-MCNC: 249 UG/DL (ref 131–425)
VIT B12 SERPL-MCNC: 683 PG/ML (ref 232–1245)
WBC # BLD AUTO: 12.1 X10E3/UL (ref 3.4–10.8)

## 2021-06-08 ENCOUNTER — VIRTUAL VISIT (OUTPATIENT)
Age: 51
End: 2021-06-08
Payer: COMMERCIAL

## 2021-06-08 DIAGNOSIS — D50.8 OTHER IRON DEFICIENCY ANEMIA: ICD-10-CM

## 2021-06-08 DIAGNOSIS — Z72.0 TOBACCO ABUSE: ICD-10-CM

## 2021-06-08 DIAGNOSIS — D75.839 THROMBOCYTOSIS: ICD-10-CM

## 2021-06-08 DIAGNOSIS — D72.9 NEUTROPHILIA: Primary | ICD-10-CM

## 2021-06-08 PROCEDURE — 99442 PR PHYS/QHP TELEPHONE EVALUATION 11-20 MIN: CPT | Performed by: INTERNAL MEDICINE

## 2021-06-08 RX ORDER — OXYCODONE AND ACETAMINOPHEN 5; 325 MG/1; MG/1
TABLET ORAL
COMMUNITY
End: 2021-08-02 | Stop reason: ALTCHOICE

## 2021-06-08 RX ORDER — BUPROPION HYDROCHLORIDE 150 MG/1
TABLET ORAL
COMMUNITY
Start: 2021-05-07 | End: 2021-08-02 | Stop reason: ALTCHOICE

## 2021-06-08 RX ORDER — HYDROCODONE BITARTRATE AND ACETAMINOPHEN 5; 325 MG/1; MG/1
TABLET ORAL
COMMUNITY
End: 2021-08-02 | Stop reason: ALTCHOICE

## 2021-06-08 RX ORDER — OXYCODONE AND ACETAMINOPHEN 5; 325 MG/1; MG/1
TABLET ORAL
COMMUNITY
Start: 2021-04-23 | End: 2021-08-02 | Stop reason: ALTCHOICE

## 2021-06-08 NOTE — PROGRESS NOTES
Arabella Cote is a 54 y.o. female who was seen by synchronous (real-time) audio- technology on 2021. Assessment & Plan:   Diagnoses and all orders for this visit:    1. Neutrophilia  -     CELIAC PANEL; Future  -     JAK2 MUTATION ANALYSIS; Future  -     MPL MUTATION ANALYSIS; Future  -     CALR MUTATION ANALYSIS; Future  -     BCR-ABL1, PCR, QT; Future    2. Other iron deficiency anemia  -     CELIAC PANEL; Future    3. Tobacco abuse    4. Thrombocytosis ([address]  -     CELIAC PANEL; Future  -     JAK2 MUTATION ANALYSIS; Future  -     MPL MUTATION ANALYSIS; Future  -     CALR MUTATION ANALYSIS; Future  -     BCR-ABL1, PCR, QT; Future        The complexity of medical decision making for this visit is moderate       1. Neutrophilia  I Had a long discussion with patient regarding her neutrophilia. She has no repeated infections. Not on steroids. She smokes 1/2 cigarette per day. Her father  from acute myeloid leukemia. I told that her neutrophilia is likely reactive from cigarette smoking. Myeloproliferative neoplasm work-up is negative. Labs on 2020 showed ferritin 44, transferrin saturation 16%, BUN 18, creatinine 0.76. Normal B12 and folate. JAK2, BCRABL, MPL and SOPHIE R are all negative. WBC 10.8, H&H 14.9/47.2, platelets 869. BUN 13, creatinine 0.77. Ferritin 977, transferrin saturation 21%, normal B12 and folate. *Advised patient to stop smoking      2. Iron deficiency:   *Status post IV iron Injectafer x2 in 2021  *She had hysterectomy in 2021. She tolerated well surgery. *Needs GI for colonoscopy-One is due in 2021  *Labs on 2021 showed WBC 12.1, H&H 14.9/45.2, MCV 91, platelets 182, absolute neutrophil count 10.5. *Check celiac panel before next visit     2. Tobacco abuse: Tobacco cessation counseling done. She will talk to PCP to try Wellbrutin    3.   Thrombocytosis: Check MPN panel    Thank you  for referring      CC:    RT 4 weeks-Virtual     I spent at least 15 minutes on this visit with this established patient. 712  Subjective: Prince Ragsdale is a 52 y.o.  female who I was asked to see in consultation at the request of Dr. Anthony Rainey for evaluation for leukocytosis. I saw patient on 1/18/2020 and she is here for follow-up.     I saw and examined patient today. She is awake alert oriented x3. On review of systems she denies any fevers, chills, weight loss, lumps and bumps, or focal neurologic deficit. No drenching night sweats. No repeated infections. Has chronic headache and dizziness. All other point of review of system have been reviewed and were negative. ECOG performance status 0. Independent with ADLs and IADLs.      Prior to Admission medications    Medication Sig Start Date End Date Taking? Authorizing Provider   buPROPion XL (WELLBUTRIN XL) 150 mg tablet TAKE 1 TABLET BY MOUTH ONCE DAILY IN THE MORNING FOR 30 DAYS 5/7/21   Provider, Historical   HYDROcodone-acetaminophen (NORCO) 5-325 mg per tablet hydrocodone 5 mg-acetaminophen 325 mg tablet    Provider, Historical   oxyCODONE-acetaminophen (PERCOCET) 5-325 mg per tablet  4/23/21   Provider, Historical   oxyCODONE-acetaminophen (Percocet) 5-325 mg per tablet Percocet 5 mg-325 mg tablet   Take 1 tablet every 6 hours by oral route. Provider, Historical   mirabegron ER (Myrbetriq) 50 mg ER tablet Take 1 Tab by mouth daily. 5/7/21   Xochitl Roy MD   solifenacin (VESICARE) 10 mg tablet Take 1 Tab by mouth nightly. 5/5/21   Xochitl Roy MD   methocarbamoL (ROBAXIN) 750 mg tablet Take 1 Tab by mouth four (4) times daily as needed for Muscle Spasm(s). 5/4/21   You Harding MD   promethazine (PHENERGAN) 25 mg tablet Take 1 Tab by mouth every six (6) hours as needed (nausea and/or headache). 5/4/21   You Harding MD   butalbitaL-acetaminophen (PHRENILIN)  mg tablet Take 1 Tab by mouth every six (6) hours as needed. Provider, Historical   pantoprazole (PROTONIX) 40 mg tablet Take 40 mg by mouth daily. Provider, Historical   erenumab-aooe (Aimovig Autoinjector, 2 Pack,) 70 mg/mL injection 70 mg by SubCUTAneous route once. On the 13th of each month    Provider, Historical   acetaminophen (Tylenol Extra Strength) 500 mg tablet Take 1,000 mg by mouth every six (6) hours as needed for Pain. Provider, Historical   aspirin/salicylamide/caffeine (BC HEADACHE POWDER PO) Take  by mouth. Provider, Historical   SUMAtriptan (Imitrex) 100 mg tablet Take 100 mg by mouth once as needed for Migraine. Provider, Historical   gabapentin (NEURONTIN) 300 mg capsule Take 1 Cap by mouth three (3) times daily. Indications: Neuropathic Pain, acute pain following an operation  Patient taking differently: Take 300 mg by mouth two (2) times a day. Indications: neuropathic pain, acute pain following an operation 6/21/19   Durga DAMON,    ondansetron hcl (ZOFRAN) 4 mg tablet Take 4 mg by mouth every twelve (12) hours as needed for Nausea. Other, MD Vincent   fluticasone (FLONASE) 50 mcg/actuation nasal spray 2 Sprays by Both Nostrils route daily.  8/13/18   Candance Lota, NP     Patient Active Problem List   Diagnosis Code    Depression F32.9    Urinary frequency R35.0    Nocturia R35.1    Pelvic pain affecting pregnancy O26.899, R10.2    Vaginitis N76.0    Incomplete emptying of bladder R33.9    Open fracture of right fibula and tibia S82.201B, S82.401B    Open fracture of right tibia and fibula S82.201B, S82.401B    Neutrophilia D72.9    Tobacco abuse Z72.0    Iron deficiency anemia D50.9    Thrombocytosis (HCC) D47.3    Abdominal pain R10.9    Anemia D64.9    Anxiety state F41.1    Chronic fatigue syndrome R53.82    Chronic migraine without aura without status migrainosus, not intractable G43.709    Cyst of ovary N83.209    Dysfunctional uterine bleeding N93.8    Dyspareunia [id number]    Female genital symptoms N94.9    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 N73.6    General symptom R68.89    Mittelschmerz N94.0    Occipital neuralgia of left side M54.81    Pregnancy, delivered O80    Premenstrual tension syndrome N94.3    Uterine scar from previous  delivery, with delivery O34.219    Vitamin D deficiency E55.9    Pelvic pain R10.2     Patient Active Problem List    Diagnosis Date Noted    Pelvic pain 2021    Abdominal pain 2021    Anemia 2021    Anxiety state 2021    Chronic fatigue syndrome 2021    Cyst of ovary 2021    Dysfunctional uterine bleeding 2021    Dyspareunia 2021    Female genital symptoms 2021    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 2021    General symptom 2021    Mittelschmerz 2021    Pregnancy, delivered 2021    Premenstrual tension syndrome 2021    Uterine scar from previous  delivery, with delivery 2021    Vitamin D deficiency 2021    Thrombocytosis ([address] 2021    Iron deficiency anemia 2021    Neutrophilia 2021    Tobacco abuse 2021    Open fracture of right fibula and tibia 2019    Open fracture of right tibia and fibula 2019    Chronic migraine without aura without status migrainosus, not intractable 2015    Occipital neuralgia of left side 2015    Urinary frequency 2015    Nocturia 2015    Pelvic pain affecting pregnancy 2015    Vaginitis 2015    Incomplete emptying of bladder 2015    Depression 2012     Current Outpatient Medications   Medication Sig Dispense Refill    buPROPion XL (WELLBUTRIN XL) 150 mg tablet TAKE 1 TABLET BY MOUTH ONCE DAILY IN THE MORNING FOR 30 DAYS      HYDROcodone-acetaminophen (NORCO) 5-325 mg per tablet hydrocodone 5 mg-acetaminophen 325 mg tablet      oxyCODONE-acetaminophen (PERCOCET) 5-325 mg per tablet       oxyCODONE-acetaminophen (Percocet) 5-325 mg per tablet Percocet 5 mg-325 mg tablet   Take 1 tablet every 6 hours by oral route.  mirabegron ER (Myrbetriq) 50 mg ER tablet Take 1 Tab by mouth daily. 30 Tab 11    solifenacin (VESICARE) 10 mg tablet Take 1 Tab by mouth nightly. 30 Tab 11    methocarbamoL (ROBAXIN) 750 mg tablet Take 1 Tab by mouth four (4) times daily as needed for Muscle Spasm(s). 15 Tab 0    promethazine (PHENERGAN) 25 mg tablet Take 1 Tab by mouth every six (6) hours as needed (nausea and/or headache). 12 Tab 0    butalbitaL-acetaminophen (PHRENILIN)  mg tablet Take 1 Tab by mouth every six (6) hours as needed.  pantoprazole (PROTONIX) 40 mg tablet Take 40 mg by mouth daily.  erenumab-aooe (Aimovig Autoinjector, 2 Pack,) 70 mg/mL injection 70 mg by SubCUTAneous route once. On the 13th of each month      acetaminophen (Tylenol Extra Strength) 500 mg tablet Take 1,000 mg by mouth every six (6) hours as needed for Pain.  aspirin/salicylamide/caffeine (BC HEADACHE POWDER PO) Take  by mouth.  SUMAtriptan (Imitrex) 100 mg tablet Take 100 mg by mouth once as needed for Migraine.  gabapentin (NEURONTIN) 300 mg capsule Take 1 Cap by mouth three (3) times daily. Indications: Neuropathic Pain, acute pain following an operation (Patient taking differently: Take 300 mg by mouth two (2) times a day. Indications: neuropathic pain, acute pain following an operation) 90 Cap 0    ondansetron hcl (ZOFRAN) 4 mg tablet Take 4 mg by mouth every twelve (12) hours as needed for Nausea.  fluticasone (FLONASE) 50 mcg/actuation nasal spray 2 Sprays by Both Nostrils route daily.  1 Bottle 0     No Known Allergies  Past Medical History:   Diagnosis Date    Anemia     Bladder adhesions     Concussion 05/20/2018    Cyst of ovary     Dysfunctional uterine bleeding     Dysmenorrhea     Female pelvic peritoneal adhesion     GERD (gastroesophageal reflux disease)     reflux    Migraine     Nausea & vomiting     HA    OAB (overactive bladder)     Urinary frequency     Vaginitis and vulvovaginitis     Vitamin D deficiency      Past Surgical History:   Procedure Laterality Date    HX  SECTION      x3    HX HYSTERECTOMY  2021    HX ORTHOPAEDIC  2019    bone graft and plate to right fibia, s/p lower leg fracture    HX ORTHOPAEDIC  2019    jodie in her tibia right( both procedures connected)     Family History   Problem Relation Age of Onset    Thyroid Disease Mother     High Cholesterol Mother     Cancer Father      Social History     Tobacco Use    Smoking status: Current Every Day Smoker     Packs/day: 0.50     Types: Cigarettes    Smokeless tobacco: Never Used    Tobacco comment: less than a half pack per day   Substance Use Topics    Alcohol use: Not Currently     Alcohol/week: 2.0 standard drinks     Types: 2 Glasses of wine per week     Comment: quit in        ROS: As per HPI    Objective:     Patient-Reported Vitals 2021   Patient-Reported Weight 185lbs      General: alert, cooperative, no distress     Additional exam findings: We discussed the expected course, resolution and complications of the diagnosis(es) in detail. Medication risks, benefits, costs, interactions, and alternatives were discussed as indicated. I advised her to contact the office if her condition worsens, changes or fails to improve as anticipated. She expressed understanding with the diagnosis(es) and plan. Soy Fuentes, who was evaluated through a patient-initiated, synchronous (real-time) audio-encounter, and/or her healthcare decision maker, is aware that it is a billable service, with coverage as determined by her insurance carrier. She provided verbal consent to proceed: Yes, and patient identification was verified.  It was conducted pursuant to the emergency declaration under the [facility] waiver authority and the Coronavirus Preparedness and Response Supplemental Appropriations Act. A caregiver was present when appropriate. Ability to conduct physical exam was limited. I was at home. The patient was at home.       José Cote MD

## 2021-07-01 ENCOUNTER — TELEPHONE (OUTPATIENT)
Age: 51
End: 2021-07-01

## 2021-07-01 NOTE — TELEPHONE ENCOUNTER
Left message on voicemail to have patient contact our office to schedule appointment for labs and re-schedule appointment

## 2021-07-06 ENCOUNTER — TELEPHONE (OUTPATIENT)
Age: 51
End: 2021-07-06

## 2021-07-06 ENCOUNTER — VIRTUAL VISIT (OUTPATIENT)
Age: 51
End: 2021-07-06
Payer: COMMERCIAL

## 2021-07-06 DIAGNOSIS — D50.8 OTHER IRON DEFICIENCY ANEMIA: ICD-10-CM

## 2021-07-06 DIAGNOSIS — D72.9 NEUTROPHILIA: Primary | ICD-10-CM

## 2021-07-06 DIAGNOSIS — Z72.0 TOBACCO ABUSE: ICD-10-CM

## 2021-07-06 LAB
BACKGROUND, 081113: NORMAL
BACKGROUND: 480503: NORMAL
BACKGROUND: 480503: NORMAL
CALR MUTATION DETECTION RESULT, 489451: NORMAL
ENDOMYSIUM IGA SER QL: NEGATIVE
GLIADIN PEPTIDE IGA SER-ACNC: 6 UNITS (ref 0–19)
GLIADIN PEPTIDE IGG SER-ACNC: 2 UNITS (ref 0–19)
IGA SERPL-MCNC: 126 MG/DL (ref 87–352)
INTERPRETATION: 480488: NEGATIVE
JAK2 P.V617F BLD/T QL: NORMAL
LAB DIRECTOR NAME PROVIDER: NORMAL
MPL GENE MUT TESTED BLD/T: NORMAL
MPL P.W515L+W515K+S505N BLD/T QL: NORMAL
REF LAB TEST METHOD: NORMAL
REF LAB TEST METHOD: NORMAL
REFERENCES, 150293: NORMAL
REFERENCES, 150293: NORMAL
SERVICE CMNT-IMP: NORMAL
T(ABL1,BCR)B2A2/CONTROL BLD/T: NORMAL %
T(ABL1,BCR)B3A2/CONTROL BLD/T: NORMAL %
T(ABL1,BCR)E1A2/CONTROL BLD/T: NORMAL %
TTG IGA SER-ACNC: <2 U/ML (ref 0–3)
TTG IGG SER-ACNC: <2 U/ML (ref 0–5)

## 2021-07-06 PROCEDURE — 99442 PR PHYS/QHP TELEPHONE EVALUATION 11-20 MIN: CPT | Performed by: INTERNAL MEDICINE

## 2021-07-06 NOTE — PROGRESS NOTES
Radha Hester is a 54 y.o. female who was seen by synchronous (real-time) audio- technology on 2021. Assessment & Plan:   Diagnoses and all orders for this visit:    1. Neutrophilia    2. Tobacco abuse    3. Other iron deficiency anemia        The complexity of medical decision making for this visit is moderate       1. Neutrophilia  I Had a long discussion with patient regarding her neutrophilia. She has no repeated infections. Not on steroids. She smokes 1/2 cigarette per day. Her father  from acute myeloid leukemia. JAK2, BCRABL, MPL and SOPHIE R are all negative. I told that her neutrophilia is likely reactive from cigarette smoking. Myeloproliferative neoplasm work-up is negative. *Advised patient to stop smoking      2. Iron deficiency:   *Status post IV iron Injectafer x2 in 2021  *She had hysterectomy in 2021. She tolerated well surgery. *Needs GI for colonoscopy-One is due in 2021  *Labs on 2021 showed WBC 12.1, H&H 14.9/45.2, MCV 91, platelets 089, absolute neutrophil count 10.5. *F/U with .     2. Tobacco abuse: Tobacco cessation counseling done. She will talk to PCP to try Wellbrutin    3. Thrombocytosis: MPN panel negative    Thank you  for referring Mrs. Fontaine     CC:    RTC 6 months or prn based on her preference and needs     I spent at least 15 minutes on this visit with this established patient. 712  Subjective: Radha Hester is a 54 y.o.  female who I was asked to see in consultation at the request of Dr. Johannah Kocher for evaluation for leukocytosis. I saw patient on 2020 and she is here for follow-up.     I saw and examined patient today. She is awake alert oriented x3. On review of systems she denies any fevers, chills, weight loss, lumps and bumps, or focal neurologic deficit. No drenching night sweats. No repeated infections. Has chronic headache and dizziness. No tinnitus.  All other point of review of system have been reviewed and were negative. ECOG performance status 0. Independent with ADLs and IADLs.      Prior to Admission medications    Medication Sig Start Date End Date Taking? Authorizing Provider   buPROPion XL (WELLBUTRIN XL) 150 mg tablet TAKE 1 TABLET BY MOUTH ONCE DAILY IN THE MORNING FOR 30 DAYS 5/7/21   Provider, Historical   HYDROcodone-acetaminophen (NORCO) 5-325 mg per tablet hydrocodone 5 mg-acetaminophen 325 mg tablet    Provider, Historical   oxyCODONE-acetaminophen (PERCOCET) 5-325 mg per tablet  4/23/21   Provider, Historical   oxyCODONE-acetaminophen (Percocet) 5-325 mg per tablet Percocet 5 mg-325 mg tablet   Take 1 tablet every 6 hours by oral route. Provider, Historical   mirabegron ER (Myrbetriq) 50 mg ER tablet Take 1 Tab by mouth daily. 5/7/21   Juana Han MD   solifenacin (VESICARE) 10 mg tablet Take 1 Tab by mouth nightly. 5/5/21   Juana Han MD   methocarbamoL (ROBAXIN) 750 mg tablet Take 1 Tab by mouth four (4) times daily as needed for Muscle Spasm(s). 5/4/21   Jackelyn Morales MD   promethazine (PHENERGAN) 25 mg tablet Take 1 Tab by mouth every six (6) hours as needed (nausea and/or headache). 5/4/21   Jackelyn Morales MD   butalbitaL-acetaminophen (PHRENILIN)  mg tablet Take 1 Tab by mouth every six (6) hours as needed. Provider, Historical   pantoprazole (PROTONIX) 40 mg tablet Take 40 mg by mouth daily. Provider, Historical   erenumab-aooe (Aimovig Autoinjector, 2 Pack,) 70 mg/mL injection 70 mg by SubCUTAneous route once. On the 13th of each month    Provider, Historical   acetaminophen (Tylenol Extra Strength) 500 mg tablet Take 1,000 mg by mouth every six (6) hours as needed for Pain. Provider, Historical   aspirin/salicylamide/caffeine (BC HEADACHE POWDER PO) Take  by mouth. Provider, Historical   SUMAtriptan (Imitrex) 100 mg tablet Take 100 mg by mouth once as needed for Migraine.     Provider, Historical   gabapentin (NEURONTIN) 300 mg capsule Take 1 Cap by mouth three (3) times daily. Indications: Neuropathic Pain, acute pain following an operation  Patient taking differently: Take 300 mg by mouth two (2) times a day. Indications: neuropathic pain, acute pain following an operation 19   Vanessa DAMON DO   ondansetron hcl (ZOFRAN) 4 mg tablet Take 4 mg by mouth every twelve (12) hours as needed for Nausea. Other, MD Vincent   fluticasone (FLONASE) 50 mcg/actuation nasal spray 2 Sprays by Both Nostrils route daily.  18   Rui Dorsey NP     Patient Active Problem List   Diagnosis Code    Depression F32.9    Urinary frequency R35.0    Nocturia R35.1    Pelvic pain affecting pregnancy O26.899, R10.2    Vaginitis N76.0    Incomplete emptying of bladder R33.9    Open fracture of right fibula and tibia S82.201B, S82.401B    Open fracture of right tibia and fibula S82.201B, S82.401B    Neutrophilia D72.9    Tobacco abuse Z72.0    Iron deficiency anemia D50.9    Thrombocytosis (HCC) D47.3    Abdominal pain R10.9    Anemia D64.9    Anxiety state F41.1    Chronic fatigue syndrome R53.82    Chronic migraine without aura without status migrainosus, not intractable G43.709    Cyst of ovary N83.209    Dysfunctional uterine bleeding N93.8    Dyspareunia [id number]    Female genital symptoms N94.9    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 N73.6    General symptom R68.89    Mittelschmerz N94.0    Occipital neuralgia of left side M54.81    Pregnancy, delivered O80    Premenstrual tension syndrome N94.3    Uterine scar from previous  delivery, with delivery O34.219    Vitamin D deficiency E55.9    Pelvic pain R10.2     Patient Active Problem List    Diagnosis Date Noted    Pelvic pain 2021    Abdominal pain 2021    Anemia 2021    Anxiety state 2021    Chronic fatigue syndrome 2021    Cyst of ovary 2021    Dysfunctional uterine bleeding 2021    Dyspareunia 2021    Female genital symptoms 2021    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 2021    General symptom 2021    Mitjaylanmerz 2021    Pregnancy, delivered 2021    Premenstrual tension syndrome 2021    Uterine scar from previous  delivery, with delivery 2021    Vitamin D deficiency 2021    Thrombocytosis ([address] 2021    Iron deficiency anemia 2021    Neutrophilia 2021    Tobacco abuse 2021    Open fracture of right fibula and tibia 2019    Open fracture of right tibia and fibula 2019    Chronic migraine without aura without status migrainosus, not intractable 2015    Occipital neuralgia of left side 2015    Urinary frequency 2015    Nocturia 2015    Pelvic pain affecting pregnancy 2015    Vaginitis 2015    Incomplete emptying of bladder 2015    Depression 2012     Current Outpatient Medications   Medication Sig Dispense Refill    buPROPion XL (WELLBUTRIN XL) 150 mg tablet TAKE 1 TABLET BY MOUTH ONCE DAILY IN THE MORNING FOR 30 DAYS      HYDROcodone-acetaminophen (NORCO) 5-325 mg per tablet hydrocodone 5 mg-acetaminophen 325 mg tablet      oxyCODONE-acetaminophen (PERCOCET) 5-325 mg per tablet       oxyCODONE-acetaminophen (Percocet) 5-325 mg per tablet Percocet 5 mg-325 mg tablet   Take 1 tablet every 6 hours by oral route.  mirabegron ER (Myrbetriq) 50 mg ER tablet Take 1 Tab by mouth daily. 30 Tab 11    solifenacin (VESICARE) 10 mg tablet Take 1 Tab by mouth nightly. 30 Tab 11    methocarbamoL (ROBAXIN) 750 mg tablet Take 1 Tab by mouth four (4) times daily as needed for Muscle Spasm(s). 15 Tab 0    promethazine (PHENERGAN) 25 mg tablet Take 1 Tab by mouth every six (6) hours as needed (nausea and/or headache). 12 Tab 0    butalbitaL-acetaminophen (PHRENILIN)  mg tablet Take 1 Tab by mouth every six (6) hours as needed.       pantoprazole (PROTONIX) 40 mg tablet Take 40 mg by mouth daily.  erenumab-aooe (Aimovig Autoinjector, 2 Pack,) 70 mg/mL injection 70 mg by SubCUTAneous route once. On the  of each month      acetaminophen (Tylenol Extra Strength) 500 mg tablet Take 1,000 mg by mouth every six (6) hours as needed for Pain.  aspirin/salicylamide/caffeine (BC HEADACHE POWDER PO) Take  by mouth.  SUMAtriptan (Imitrex) 100 mg tablet Take 100 mg by mouth once as needed for Migraine.  gabapentin (NEURONTIN) 300 mg capsule Take 1 Cap by mouth three (3) times daily. Indications: Neuropathic Pain, acute pain following an operation (Patient taking differently: Take 300 mg by mouth two (2) times a day. Indications: neuropathic pain, acute pain following an operation) 90 Cap 0    ondansetron hcl (ZOFRAN) 4 mg tablet Take 4 mg by mouth every twelve (12) hours as needed for Nausea.  fluticasone (FLONASE) 50 mcg/actuation nasal spray 2 Sprays by Both Nostrils route daily.  1 Bottle 0     No Known Allergies  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     GERD (gastroesophageal reflux disease)     reflux    Migraine     Nausea & vomiting     HA    OAB (overactive bladder)     Urinary frequency     Vaginitis and vulvovaginitis     Vitamin D deficiency      Past Surgical History:   Procedure Laterality Date    HX  SECTION      x3    HX HYSTERECTOMY  2021    HX ORTHOPAEDIC  2019    bone graft and plate to right fibia, s/p lower leg fracture    HX ORTHOPAEDIC  2019    jodie in her tibia right( both procedures connected)     Family History   Problem Relation Age of Onset    Thyroid Disease Mother     High Cholesterol Mother     Cancer Father      Social History     Tobacco Use    Smoking status: Current Every Day Smoker     Packs/day: 0.50     Types: Cigarettes    Smokeless tobacco: Never Used    Tobacco comment: less than a half pack per day   Substance Use Topics    Alcohol use: Not Currently     Alcohol/week: 2.0 standard drinks     Types: 2 Glasses of wine per week     Comment: quit in 2006       ROS: As per HPI    Objective:     Patient-Reported Vitals 6/8/2021   Patient-Reported Weight 185lbs      General: alert, cooperative, no distress     Additional exam findings: We discussed the expected course, resolution and complications of the diagnosis(es) in detail. Medication risks, benefits, costs, interactions, and alternatives were discussed as indicated. I advised her to contact the office if her condition worsens, changes or fails to improve as anticipated. She expressed understanding with the diagnosis(es) and plan. Forest Dela Cruz, who was evaluated through a patient-initiated, synchronous (real-time) audio-encounter, and/or her healthcare decision maker, is aware that it is a billable service, with coverage as determined by her insurance carrier. She provided verbal consent to proceed: Yes, and patient identification was verified. It was conducted pursuant to the emergency declaration under the [address] [place] [address], [address] [place] [address] authority and the [employer] Act. A caregiver was present when appropriate. Ability to conduct physical exam was limited. I was at home. The patient was at home.       Roxie Pereyra MD

## 2021-09-21 DIAGNOSIS — D50.8 OTHER IRON DEFICIENCY ANEMIA: ICD-10-CM

## 2021-09-21 DIAGNOSIS — D72.9 NEUTROPHILIA: ICD-10-CM

## 2021-09-21 DIAGNOSIS — D75.839 THROMBOCYTOSIS: ICD-10-CM

## 2021-12-29 PROBLEM — Z87.820 HISTORY OF MULTIPLE CONCUSSIONS: Status: ACTIVE | Noted: 2021-12-29

## 2021-12-29 PROBLEM — R20.2 NUMBNESS AND TINGLING OF LEFT LOWER EXTREMITY: Status: ACTIVE | Noted: 2021-12-29

## 2021-12-29 PROBLEM — R20.0 LEFT FACIAL NUMBNESS: Status: ACTIVE | Noted: 2021-12-29

## 2021-12-29 PROBLEM — R20.0 NUMBNESS AND TINGLING OF LEFT LOWER EXTREMITY: Status: ACTIVE | Noted: 2021-12-29

## 2021-12-29 PROBLEM — Z86.69 HISTORY OF MIGRAINE HEADACHES: Status: ACTIVE | Noted: 2021-12-29

## 2021-12-29 PROBLEM — R20.0 LEFT UPPER EXTREMITY NUMBNESS: Status: ACTIVE | Noted: 2021-12-29

## 2022-01-04 ENCOUNTER — TELEPHONE (OUTPATIENT)
Age: 52
End: 2022-01-04

## 2022-01-04 NOTE — TELEPHONE ENCOUNTER
Patient has been experiencing Nausea, Dizziness every day, Fatigue this started in November and has progressively started getting worse. She is schedule to see Riya Mora on 1/14. Are labs needed?

## 2022-01-05 DIAGNOSIS — D50.8 OTHER IRON DEFICIENCY ANEMIA: Primary | ICD-10-CM

## 2022-01-06 DIAGNOSIS — D72.9 NEUTROPHILIA: Primary | ICD-10-CM

## 2022-01-06 DIAGNOSIS — Z72.0 TOBACCO ABUSE: ICD-10-CM

## 2022-01-06 DIAGNOSIS — R11.0 NAUSEA: ICD-10-CM

## 2022-01-06 DIAGNOSIS — D50.8 OTHER IRON DEFICIENCY ANEMIA: ICD-10-CM

## 2022-01-08 LAB
25(OH)D3+25(OH)D2 SERPL-MCNC: 27.4 NG/ML (ref 30–100)
ALBUMIN SERPL-MCNC: 4.9 G/DL (ref 3.8–4.8)
ALBUMIN/GLOB SERPL: 1.9 {RATIO} (ref 1.2–2.2)
ALP SERPL-CCNC: 187 IU/L (ref 44–121)
ALT SERPL-CCNC: 24 IU/L (ref 0–32)
AST SERPL-CCNC: 17 IU/L (ref 0–40)
BASOPHILS # BLD AUTO: 0.1 X10E3/UL (ref 0–0.2)
BASOPHILS NFR BLD AUTO: 1 %
BILIRUB SERPL-MCNC: <0.2 MG/DL (ref 0–1.2)
BUN SERPL-MCNC: 15 MG/DL (ref 6–24)
BUN/CREAT SERPL: 16 (ref 9–23)
CALCIUM SERPL-MCNC: 10.2 MG/DL (ref 8.7–10.2)
CHLORIDE SERPL-SCNC: 100 MMOL/L (ref 96–106)
CO2 SERPL-SCNC: 24 MMOL/L (ref 20–29)
CREAT SERPL-MCNC: 0.92 MG/DL (ref 0.57–1)
EOSINOPHIL # BLD AUTO: 0 X10E3/UL (ref 0–0.4)
EOSINOPHIL NFR BLD AUTO: 0 %
ERYTHROCYTE [DISTWIDTH] IN BLOOD BY AUTOMATED COUNT: 12 % (ref 11.7–15.4)
FERRITIN SERPL-MCNC: 412 NG/ML (ref 15–150)
FOLATE SERPL-MCNC: 3.1 NG/ML
GLOBULIN SER CALC-MCNC: 2.6 G/DL (ref 1.5–4.5)
GLUCOSE SERPL-MCNC: 113 MG/DL (ref 65–99)
HCT VFR BLD AUTO: 47.2 % (ref 34–46.6)
HGB BLD-MCNC: 15.8 G/DL (ref 11.1–15.9)
IMM GRANULOCYTES # BLD AUTO: 0.1 X10E3/UL (ref 0–0.1)
IMM GRANULOCYTES NFR BLD AUTO: 1 %
IRON SATN MFR SERPL: 25 % (ref 15–55)
IRON SERPL-MCNC: 82 UG/DL (ref 27–159)
LYMPHOCYTES # BLD AUTO: 1.1 X10E3/UL (ref 0.7–3.1)
LYMPHOCYTES NFR BLD AUTO: 7 %
MCH RBC QN AUTO: 30.3 PG (ref 26.6–33)
MCHC RBC AUTO-ENTMCNC: 33.5 G/DL (ref 31.5–35.7)
MCV RBC AUTO: 91 FL (ref 79–97)
MONOCYTES # BLD AUTO: 0.5 X10E3/UL (ref 0.1–0.9)
MONOCYTES NFR BLD AUTO: 3 %
NEUTROPHILS # BLD AUTO: 13.4 X10E3/UL (ref 1.4–7)
NEUTROPHILS NFR BLD AUTO: 88 %
PLATELET # BLD AUTO: 378 X10E3/UL (ref 150–450)
POTASSIUM SERPL-SCNC: 5.1 MMOL/L (ref 3.5–5.2)
PROT SERPL-MCNC: 7.5 G/DL (ref 6–8.5)
RBC # BLD AUTO: 5.21 X10E6/UL (ref 3.77–5.28)
SODIUM SERPL-SCNC: 140 MMOL/L (ref 134–144)
T4 FREE SERPL-MCNC: 1.18 NG/DL (ref 0.82–1.77)
TIBC SERPL-MCNC: 330 UG/DL (ref 250–450)
TSH SERPL DL<=0.005 MIU/L-ACNC: 1.24 UIU/ML (ref 0.45–4.5)
UIBC SERPL-MCNC: 248 UG/DL (ref 131–425)
VIT B12 SERPL-MCNC: 1119 PG/ML (ref 232–1245)
WBC # BLD AUTO: 15.2 X10E3/UL (ref 3.4–10.8)

## 2022-01-14 ENCOUNTER — OFFICE VISIT (OUTPATIENT)
Age: 52
End: 2022-01-14
Payer: COMMERCIAL

## 2022-01-14 VITALS
DIASTOLIC BLOOD PRESSURE: 81 MMHG | WEIGHT: 182 LBS | OXYGEN SATURATION: 96 % | HEIGHT: 63 IN | RESPIRATION RATE: 16 BRPM | HEART RATE: 90 BPM | SYSTOLIC BLOOD PRESSURE: 116 MMHG | TEMPERATURE: 98.2 F | BODY MASS INDEX: 32.25 KG/M2

## 2022-01-14 DIAGNOSIS — E55.9 VITAMIN D DEFICIENCY: ICD-10-CM

## 2022-01-14 DIAGNOSIS — E53.8 VITAMIN B12 DEFICIENCY: ICD-10-CM

## 2022-01-14 DIAGNOSIS — D50.8 IRON DEFICIENCY ANEMIA SECONDARY TO INADEQUATE DIETARY IRON INTAKE: Primary | ICD-10-CM

## 2022-01-14 PROCEDURE — 99212 OFFICE O/P EST SF 10 MIN: CPT | Performed by: NURSE PRACTITIONER

## 2022-01-14 RX ORDER — ERGOCALCIFEROL 1.25 MG/1
50000 CAPSULE ORAL
Qty: 8 CAPSULE | Refills: 0 | Status: SHIPPED | OUTPATIENT
Start: 2022-01-14 | End: 2022-01-14 | Stop reason: SDUPTHER

## 2022-01-14 RX ORDER — ERGOCALCIFEROL 1.25 MG/1
50000 CAPSULE ORAL
Qty: 8 CAPSULE | Refills: 0 | Status: SHIPPED | OUTPATIENT
Start: 2022-01-14 | End: 2022-07-29 | Stop reason: SDUPTHER

## 2022-01-14 NOTE — PROGRESS NOTES
HEMATOLOGY/MEDICAL ONCOLOGY PROGRESS NOTE    NAME: Mely Smith  : 1970  DATE: 22    PCP: Rimma Ceja MD    SUBJECTIVE:  Ms Mely Smith is a 54 y.o. female who was seen for management of her Neutrophilia, iron Deficiency, and Vitamin D Deficiency. Patient states she is taking Vitamin D 4000IU daily. She also reports she completed IV Iron in 2021 and tolerated it well. She is here today with her son. She denies any fevers, chills, recurrent infections, and skin rash. She denies abdominal pain, nausea, vomiting, diarrhea, or constipation. She denies shortness of breath, dizziness, chest pains, fatigue, weakness, and palpitations. She denies any bleeding. She denies pain or any discomfort. She does not have any concerns or complaints to report at this time. Past Medical History, Surgical History, Family, and Social History reviewed and remain unchanged.     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     GERD (gastroesophageal reflux disease)     reflux    High cholesterol     Migraine     Migraine     Nausea & vomiting     HA    OAB (overactive bladder)     Urinary frequency     Vaginitis and vulvovaginitis     Vitamin D deficiency      Past Surgical History:   Procedure Laterality Date    HX  SECTION      x3    HX HYSTERECTOMY  2021    HX ORTHOPAEDIC  2019    bone graft and plate to right fibia, s/p lower leg fracture    HX ORTHOPAEDIC  2019    jodie in her tibia right( both procedures connected)     Social History     Socioeconomic History    Marital status:    Tobacco Use    Smoking status: Current Every Day Smoker     Packs/day: 0.50     Types: Cigarettes    Smokeless tobacco: Never Used    Tobacco comment: less than a half pack per day   Vaping Use    Vaping Use: Never used   Substance and Sexual Activity    Alcohol use: Not Currently     Alcohol/week: 2.0 standard drinks     Types: 2 Glasses of wine per week     Comment: quit in 2006    Drug use: Not Currently    Sexual activity: Not Currently     Family History   Problem Relation Age of Onset    Thyroid Disease Mother     High Cholesterol Mother     Cancer Father        Current Outpatient Medications   Medication Sig Dispense Refill    sertraline (Zoloft) 25 mg tablet Take 25 mg by mouth daily.  gabapentin (NEURONTIN) 300 mg capsule Take 300 mg by mouth two (2) times a day.  magnesium oxide 250 mg magnesium tablet Take 1 Tablet by mouth daily. (Patient taking differently: Take 400 mg by mouth daily.) 30 Tablet 0    riboflavin, vitamin B2, (Vitamin B-2) 100 mg tablet Take 2 Tablets by mouth two (2) times a day. 60 Tablet 0    melatonin 3 mg cap capsule Take 1 Capsule by mouth nightly as needed (insomnia). 30 Capsule 0    pantoprazole (PROTONIX) 40 mg tablet Take 40 mg by mouth daily.  methocarbamoL (Robaxin-750) 750 mg tablet Take 1 Tablet by mouth four (4) times daily. 20 Tablet 0    lidocaine (Lidoderm) 5 % Apply patch to the affected area for 12 hours a day and remove for 12 hours a day. 30 Patch 0    butalbitaL-acetaminophen (PHRENILIN)  mg tablet Take 1 Tab by mouth every six (6) hours as needed.  erenumab-aooe (Aimovig Autoinjector, 2 Pack,) 70 mg/mL injection 140 mg by SubCUTAneous route every month. On the 13th of each month       acetaminophen (Tylenol Extra Strength) 500 mg tablet Take 1,000 mg by mouth every six (6) hours as needed for Pain.  aspirin/salicylamide/caffeine (BC HEADACHE POWDER PO) Take  by mouth.  SUMAtriptan (Imitrex) 100 mg tablet Take 100 mg by mouth once as needed for Migraine.  ondansetron hcl (ZOFRAN) 4 mg tablet Take 4 mg by mouth every twelve (12) hours as needed for Nausea.       fluticasone (FLONASE) 50 mcg/actuation nasal spray 2 Sprays by Both Nostrils route daily. 1 Bottle 0    ergocalciferol (ERGOCALCIFEROL) 1,250 mcg (50,000 unit) capsule Take 1 Capsule by mouth every seven (7) days. 8 Capsule 0       No Known Allergies    Review of Systems  As per HPi    Objective:  Physical Exam:  Visit Vitals  /81   Pulse 90   Temp 98.2 °F (36.8 °C)   Resp 16   Ht 5' 3\" (1.6 m)   Wt 82.6 kg (182 lb)   LMP 11/09/2012   SpO2 96%   BMI 32.24 kg/m²       General:  Alert, cooperative, no distress, appears stated age. Head:  Normocephalic, without obvious abnormality, atraumatic. Eyes:  Conjunctivae/corneas clear. PERRL, EOMs intact. Throat: Lips, mucosa, and tongue normal.    Neck: Supple, symmetrical, trachea midline, no adenopathy, thyroid: no enlargement/tenderness/nodules   Back:   Symmetric, no curvature. ROM normal. No CVA tenderness. Lungs:   Clear to auscultation bilaterally. Chest wall:  No tenderness or deformity. Heart:  Regular rate and rhythm, S1, S2 normal, no murmur, click, rub or gallop. Abdomen:   Soft, non-tender. Bowel sounds normal. No masses,  No organomegaly. Extremities: Extremities normal, atraumatic, no cyanosis or edema. Skin: Skin color, texture, turgor normal. No rashes or lesions. Lymph nodes: Cervical, supraclavicular, and axillary nodes normal.   Neurologic: CNII-XII intact. Diagnostic Imaging     Results for orders placed during the hospital encounter of 12/29/21    CT HEAD WO CONT    Narrative  INDICATION: Left-sided numbness in face and upper and lower extremities, onset 1  week ago as intermittent, persistent since 6 PM.  COMPARISON: Most recently 5/3/2021. TECHNIQUE: Axial Head CT without IV contrast. Sagittal and coronal  reconstructions were performed. All CT exams at this facility use one or more dose reduction techniques  including automatic exposure control, mA/kV adjustment per patient's size, or  iterative reconstruction technique.     FINDINGS:  Ventricles and sulci are normal in size and configuration. No extra axial  collection. No acute intracranial hemorrhage. No mass effect or edema. No CT  evidence of acute large territorial infarction. Imaged portions of the paranasal sinuses and mastoid air cells are well aerated. Calvarium is unremarkable. Impression  IMPRESSION:  1. No acute intracranial hemorrhage or mass effect. 2.  ASPECT score 10. Lab Results  Lab Results   Component Value Date/Time    WBC 15.2 (H) 2022 04:30 PM    HGB 15.8 2022 04:30 PM    HCT 47.2 (H) 2022 04:30 PM    PLATELET 362  04:30 PM    MCV 91 2022 04:30 PM       Lab Results   Component Value Date/Time    Sodium 140 2022 04:30 PM    Potassium 5.1 2022 04:30 PM    Chloride 100 2022 04:30 PM    CO2 24 2022 04:30 PM    Anion gap 2 (L) 2021 10:10 PM    Glucose 113 (H) 2022 04:30 PM    BUN 15 2022 04:30 PM    Creatinine 0.92 2022 04:30 PM    BUN/Creatinine ratio 16 2022 04:30 PM    GFR est AA 85 2022 04:30 PM    GFR est non-AA 73 2022 04:30 PM    Calcium 10.2 2022 04:30 PM    Alk. phosphatase 187 (H) 2022 04:30 PM    Protein, total 7.5 2022 04:30 PM    Albumin 4.9 (H) 2022 04:30 PM    Globulin 3.6 2021 03:35 PM    A-G Ratio 1.9 2022 04:30 PM    ALT (SGPT) 24 2022 04:30 PM     Lab Results   Component Value Date/Time    Iron 82 2022 04:30 PM    TIBC 330 2022 04:30 PM    Iron % saturation 25 2022 04:30 PM    Ferritin 412 (H) 2022 04:30 PM     Lab Results   Component Value Date/Time    Vitamin B12 1,119 2022 04:30 PM    Folate 3.1 2022 04:30 PM     Lab Results   Component Value Date/Time    VITAMIN D, 25-HYDROXY 27.4 (L) 2022 04:30 PM         Assessment/Plan  Neutrophilia  --Patient denies repeated infections  --She smokes 1/2 ppd  --Her father  from AML.  --JAK2, BCR-ABL, MPL, and CALR were all negative.   --Neutrophilia is likely from cigarette smoking  --Myeloproliferative neoplasm work up -negative  --Advised patient to stop smoking    Tobacco Abuse  --Tobacco cessation counseling completed    Thrombocytosis  --MPN panel negative    Iron Deficiency Anemia  --01/07/2022: CBC showed WBC 15.2K/uL, ghemoglobin 15.8g/dL with hematocrit of 47.2%. Platelet 034. BUN and Creatinine were both normal. Iron Sat 25%. Vit B12 and Folate were both normal  --Patient completed IV Iron in the form of Injecatfer x 2 in January 2021. --Completed hysterectomy in April 2021  --Follow with GI for Colonoscopy    Vitamin D Deficiency  --01/07/2022- Vitamin D low at 27.4  --Rx Ergocalciferol 50,000units weekly x 8 doses sent to her pharmacy. Patient states she will start right away. --We will continue to monitor  --Repeat lab in 3 months if normal will defer to PCP. Patient verbalized understanding. Follow up in 3 months with repeat labs or sooner if indicated. Patient prefers VV next visit. Orders Placed This Encounter    CBC WITH AUTOMATED DIFF     Standing Status:   Future     Standing Expiration Date:   1/15/2023    IRON PROFILE     Standing Status:   Future     Standing Expiration Date:   9/03/1654    METABOLIC PANEL, COMPREHENSIVE     Standing Status:   Future     Standing Expiration Date:   1/15/2023    FERRITIN     Standing Status:   Future     Standing Expiration Date:   1/15/2023    VITAMIN D, 25 HYDROXY     Standing Status:   Future     Standing Expiration Date:   1/15/2023    VITAMIN B12 & FOLATE     Standing Status:   Future     Standing Expiration Date:   1/15/2023    DISCONTD: ergocalciferol (ERGOCALCIFEROL) 1,250 mcg (50,000 unit) capsule     Sig: Take 1 Capsule by mouth every seven (7) days.      Dispense:  8 Capsule     Refill:  0       Radha Priest DNP  01/14/22      CC: Vivien Hammonds MD

## 2022-03-18 PROBLEM — S82.201B OPEN FRACTURE OF RIGHT TIBIA AND FIBULA: Status: ACTIVE | Noted: 2019-06-20

## 2022-03-18 PROBLEM — G93.32 CHRONIC FATIGUE SYNDROME: Status: ACTIVE | Noted: 2021-02-18

## 2022-03-18 PROBLEM — O34.219 UTERINE SCAR FROM PREVIOUS CESAREAN DELIVERY, WITH DELIVERY: Status: ACTIVE | Noted: 2021-02-18

## 2022-03-18 PROBLEM — S82.401B OPEN FRACTURE OF RIGHT TIBIA AND FIBULA: Status: ACTIVE | Noted: 2019-06-20

## 2022-03-18 PROBLEM — N93.8 DYSFUNCTIONAL UTERINE BLEEDING: Status: ACTIVE | Noted: 2021-02-18

## 2022-03-19 PROBLEM — D75.839 THROMBOCYTOSIS: Status: ACTIVE | Noted: 2021-02-01

## 2022-03-19 PROBLEM — D64.9 ANEMIA: Status: ACTIVE | Noted: 2021-02-18

## 2022-03-19 PROBLEM — E55.9 VITAMIN D DEFICIENCY: Status: ACTIVE | Noted: 2021-02-18

## 2022-03-19 PROBLEM — N83.209 CYST OF OVARY: Status: ACTIVE | Noted: 2021-02-18

## 2022-03-19 PROBLEM — R20.0 NUMBNESS AND TINGLING OF LEFT LOWER EXTREMITY: Status: ACTIVE | Noted: 2021-12-29

## 2022-03-19 PROBLEM — R20.2 NUMBNESS AND TINGLING OF LEFT LOWER EXTREMITY: Status: ACTIVE | Noted: 2021-12-29

## 2022-03-19 PROBLEM — R10.9 ABDOMINAL PAIN: Status: ACTIVE | Noted: 2021-02-18

## 2022-03-19 PROBLEM — N94.3 PREMENSTRUAL TENSION SYNDROME: Status: ACTIVE | Noted: 2021-02-18

## 2022-03-19 PROBLEM — Z87.820 HISTORY OF MULTIPLE CONCUSSIONS: Status: ACTIVE | Noted: 2021-12-29

## 2022-03-19 PROBLEM — R20.0 LEFT UPPER EXTREMITY NUMBNESS: Status: ACTIVE | Noted: 2021-12-29

## 2022-03-19 PROBLEM — N73.6 FEMALE PELVIC PERITONEAL ADHESIONS: Status: ACTIVE | Noted: 2021-02-18

## 2022-03-19 PROBLEM — R10.2 PELVIC PAIN: Status: ACTIVE | Noted: 2021-04-13

## 2022-03-19 PROBLEM — Z72.0 TOBACCO ABUSE: Status: ACTIVE | Noted: 2021-01-18

## 2022-03-19 PROBLEM — R20.0 LEFT FACIAL NUMBNESS: Status: ACTIVE | Noted: 2021-12-29

## 2022-03-19 PROBLEM — D72.9 NEUTROPHILIA: Status: ACTIVE | Noted: 2021-01-18

## 2022-03-19 PROBLEM — Z86.69 HISTORY OF MIGRAINE HEADACHES: Status: ACTIVE | Noted: 2021-12-29

## 2022-03-19 PROBLEM — R68.89 GENERAL SYMPTOM: Status: ACTIVE | Noted: 2021-02-18

## 2022-03-19 PROBLEM — F41.1 ANXIETY STATE: Status: ACTIVE | Noted: 2021-02-18

## 2022-03-20 PROBLEM — S82.201B OPEN FRACTURE OF RIGHT FIBULA AND TIBIA: Status: ACTIVE | Noted: 2019-06-20

## 2022-03-20 PROBLEM — D50.9 IRON DEFICIENCY ANEMIA: Status: ACTIVE | Noted: 2021-01-26

## 2022-03-20 PROBLEM — N94.9 FEMALE GENITAL SYMPTOMS: Status: ACTIVE | Noted: 2021-02-18

## 2022-03-20 PROBLEM — N94.0 MITTELSCHMERZ: Status: ACTIVE | Noted: 2021-02-18

## 2022-03-20 PROBLEM — S82.401B OPEN FRACTURE OF RIGHT FIBULA AND TIBIA: Status: ACTIVE | Noted: 2019-06-20

## 2022-04-19 ENCOUNTER — TELEPHONE (OUTPATIENT)
Age: 52
End: 2022-04-19

## 2022-04-19 NOTE — TELEPHONE ENCOUNTER
Patient returned call about getting lab work done. Patient stated she received a bill the last time she went to get blood work, and is waiting to hear back from insurance before proceeding with getting labs done. Patient states she was told by Reagan Clemente to take higher amounts if Vitamin D to help with fatigue. Patient reports that she has felt no change. Patient states her daughter was just diagnosed with Chron's disease, and would like to know if there are some labs the office could add to test if she has this or test why she is still feeling fatigued. Patient also states that she often has high platelet counts.

## 2022-07-12 ENCOUNTER — TELEPHONE (OUTPATIENT)
Age: 52
End: 2022-07-12

## 2022-07-12 DIAGNOSIS — D72.9 NEUTROPHILIA: ICD-10-CM

## 2022-07-12 DIAGNOSIS — E55.9 VITAMIN D DEFICIENCY: ICD-10-CM

## 2022-07-12 DIAGNOSIS — Z72.0 TOBACCO ABUSE: ICD-10-CM

## 2022-07-12 DIAGNOSIS — E53.8 VITAMIN B12 DEFICIENCY: ICD-10-CM

## 2022-07-12 DIAGNOSIS — D75.839 THROMBOCYTOSIS: ICD-10-CM

## 2022-07-12 DIAGNOSIS — D50.8 IRON DEFICIENCY ANEMIA SECONDARY TO INADEQUATE DIETARY IRON INTAKE: Primary | ICD-10-CM

## 2022-07-12 NOTE — TELEPHONE ENCOUNTER
Patient is inquiring if there might be any way we could put in for her to have additional blood work done to see if she might have Chron's disease.    Patient would also like labs to be done at [facility]. Please reorder labs under Alexy Hernandez so they can be signed and then faxed

## 2022-07-26 ENCOUNTER — HOSPITAL ENCOUNTER (OUTPATIENT)
Dept: INFUSION THERAPY | Age: 52
Discharge: HOME | End: 2022-07-26
Payer: COMMERCIAL

## 2022-07-26 VITALS — TEMPERATURE: 97.8 F

## 2022-07-26 DIAGNOSIS — E55.9 VITAMIN D DEFICIENCY: ICD-10-CM

## 2022-07-26 DIAGNOSIS — E53.8 VITAMIN B12 DEFICIENCY: ICD-10-CM

## 2022-07-26 DIAGNOSIS — D75.839 THROMBOCYTOSIS: ICD-10-CM

## 2022-07-26 DIAGNOSIS — Z72.0 TOBACCO ABUSE: ICD-10-CM

## 2022-07-26 DIAGNOSIS — D72.9 NEUTROPHILIA: ICD-10-CM

## 2022-07-26 DIAGNOSIS — D50.8 IRON DEFICIENCY ANEMIA SECONDARY TO INADEQUATE DIETARY IRON INTAKE: ICD-10-CM

## 2022-07-26 LAB
25(OH)D3 SERPL-MCNC: 24.5 NG/ML (ref 30–100)
ALBUMIN SERPL-MCNC: 3.7 G/DL (ref 3.4–5)
ALBUMIN/GLOB SERPL: 1.3 {RATIO} (ref 0.8–1.7)
ALP SERPL-CCNC: 141 U/L (ref 45–117)
ALT SERPL-CCNC: 22 U/L (ref 13–56)
ANION GAP SERPL CALC-SCNC: 6 MMOL/L (ref 3–18)
AST SERPL-CCNC: 15 U/L (ref 10–38)
BASOPHILS # BLD: 0.1 K/UL (ref 0–0.1)
BASOPHILS NFR BLD: 1 % (ref 0–2)
BILIRUB SERPL-MCNC: 0.2 MG/DL (ref 0.2–1)
BUN SERPL-MCNC: 19 MG/DL (ref 7–18)
BUN/CREAT SERPL: 26 (ref 12–20)
CALCIUM SERPL-MCNC: 9.2 MG/DL (ref 8.5–10.1)
CHLORIDE SERPL-SCNC: 106 MMOL/L (ref 100–111)
CO2 SERPL-SCNC: 30 MMOL/L (ref 21–32)
CREAT SERPL-MCNC: 0.72 MG/DL (ref 0.6–1.3)
DIFFERENTIAL METHOD BLD: ABNORMAL
EOSINOPHIL # BLD: 0.1 K/UL (ref 0–0.4)
EOSINOPHIL NFR BLD: 0 % (ref 0–5)
ERYTHROCYTE [DISTWIDTH] IN BLOOD BY AUTOMATED COUNT: 13.4 % (ref 11.6–14.5)
FERRITIN SERPL-MCNC: 205 NG/ML (ref 8–388)
FOLATE SERPL-MCNC: 5.1 NG/ML (ref 3.1–17.5)
GLOBULIN SER CALC-MCNC: 2.9 G/DL (ref 2–4)
GLUCOSE SERPL-MCNC: 78 MG/DL (ref 74–99)
HCT VFR BLD AUTO: 42.9 % (ref 35–45)
HGB BLD-MCNC: 13.8 G/DL (ref 12–16)
IMM GRANULOCYTES # BLD AUTO: 0.2 K/UL (ref 0–0.04)
IMM GRANULOCYTES NFR BLD AUTO: 1 % (ref 0–0.5)
IRON SATN MFR SERPL: 18 % (ref 20–50)
IRON SERPL-MCNC: 53 UG/DL (ref 50–175)
LYMPHOCYTES # BLD: 2 K/UL (ref 0.9–3.6)
LYMPHOCYTES NFR BLD: 11 % (ref 21–52)
MCH RBC QN AUTO: 30.1 PG (ref 24–34)
MCHC RBC AUTO-ENTMCNC: 32.2 G/DL (ref 31–37)
MCV RBC AUTO: 93.7 FL (ref 78–100)
MONOCYTES # BLD: 1 K/UL (ref 0.05–1.2)
MONOCYTES NFR BLD: 6 % (ref 3–10)
NEUTS SEG # BLD: 14.4 K/UL (ref 1.8–8)
NEUTS SEG NFR BLD: 81 % (ref 40–73)
NRBC # BLD: 0 K/UL (ref 0–0.01)
NRBC BLD-RTO: 0 PER 100 WBC
PLATELET # BLD AUTO: 362 K/UL (ref 135–420)
PMV BLD AUTO: 10.9 FL (ref 9.2–11.8)
POTASSIUM SERPL-SCNC: 4.3 MMOL/L (ref 3.5–5.5)
PROT SERPL-MCNC: 6.6 G/DL (ref 6.4–8.2)
RBC # BLD AUTO: 4.58 M/UL (ref 4.2–5.3)
SODIUM SERPL-SCNC: 142 MMOL/L (ref 136–145)
T4 FREE SERPL-MCNC: 1.1 NG/DL (ref 0.7–1.5)
TIBC SERPL-MCNC: 290 UG/DL (ref 250–450)
TSH SERPL DL<=0.05 MIU/L-ACNC: 1.19 UIU/ML (ref 0.36–3.74)
VIT B12 SERPL-MCNC: 1800 PG/ML (ref 211–911)
WBC # BLD AUTO: 17.7 K/UL (ref 4.6–13.2)

## 2022-07-26 PROCEDURE — 36415 COLL VENOUS BLD VENIPUNCTURE: CPT

## 2022-07-26 PROCEDURE — 84439 ASSAY OF FREE THYROXINE: CPT

## 2022-07-26 PROCEDURE — 82607 VITAMIN B-12: CPT

## 2022-07-26 PROCEDURE — 82784 ASSAY IGA/IGD/IGG/IGM EACH: CPT

## 2022-07-26 PROCEDURE — 82728 ASSAY OF FERRITIN: CPT

## 2022-07-26 PROCEDURE — 85025 COMPLETE CBC W/AUTO DIFF WBC: CPT

## 2022-07-26 PROCEDURE — 82306 VITAMIN D 25 HYDROXY: CPT

## 2022-07-26 PROCEDURE — 83540 ASSAY OF IRON: CPT

## 2022-07-26 PROCEDURE — 80053 COMPREHEN METABOLIC PANEL: CPT

## 2022-07-26 NOTE — PROGRESS NOTES
[facility] Progress Note    Date: 2022    Name: Jovanny Boyle    MRN: [id number]         : 1970    Peripheral Lab Draw      Ms. Fontaine to [facility], ambulatory at 1200 accompanied by self. Pt was assessed and education was provided. Ms. Monica Jenkins vitals were reviewed and patient was observed for 5 minutes prior to treatment. Visit Vitals  Temp 97.8 °F (36.6 °C)       Blood obtained peripherally from left arm x 1 attempt with butterfly needle and sent to lab per written orders. No bleeding or hematoma noted at site. Gauze and coban applied. Ms. Lemuel Gandara tolerated the phlebotomy, and had no complaints. Patient armband removed and shredded. Ms. Lemuel Gandara was discharged from [place] [address] in stable condition at 1210.      [facility] Phlebotomist PCT  2022  1:35 PM

## 2022-07-29 ENCOUNTER — TELEPHONE (OUTPATIENT)
Age: 52
End: 2022-07-29

## 2022-07-29 ENCOUNTER — OFFICE VISIT (OUTPATIENT)
Age: 52
End: 2022-07-29
Payer: COMMERCIAL

## 2022-07-29 VITALS
HEART RATE: 89 BPM | OXYGEN SATURATION: 96 % | BODY MASS INDEX: 33.24 KG/M2 | RESPIRATION RATE: 18 BRPM | SYSTOLIC BLOOD PRESSURE: 127 MMHG | HEIGHT: 63 IN | WEIGHT: 187.6 LBS | TEMPERATURE: 98.2 F | DIASTOLIC BLOOD PRESSURE: 79 MMHG

## 2022-07-29 DIAGNOSIS — E55.9 VITAMIN D DEFICIENCY: ICD-10-CM

## 2022-07-29 DIAGNOSIS — D50.8 IRON DEFICIENCY ANEMIA SECONDARY TO INADEQUATE DIETARY IRON INTAKE: Primary | ICD-10-CM

## 2022-07-29 DIAGNOSIS — E53.8 VITAMIN B12 DEFICIENCY: ICD-10-CM

## 2022-07-29 PROCEDURE — 99214 OFFICE O/P EST MOD 30 MIN: CPT | Performed by: NURSE PRACTITIONER

## 2022-07-29 RX ORDER — ERGOCALCIFEROL 1.25 MG/1
50000 CAPSULE ORAL
Qty: 12 CAPSULE | Refills: 0 | Status: SHIPPED | OUTPATIENT
Start: 2022-07-29

## 2022-07-29 RX ORDER — ERGOCALCIFEROL 1.25 MG/1
50000 CAPSULE ORAL
Qty: 12 CAPSULE | Refills: 0 | Status: SHIPPED | OUTPATIENT
Start: 2022-07-29 | End: 2022-07-29 | Stop reason: SDUPTHER

## 2022-07-29 NOTE — TELEPHONE ENCOUNTER
Patient is requesting that these prescriptions be sent to her [address] (SW), [address]    [address] [facility] ([address]) [address]   Phone:  [phone]  Fax:  [phone]

## 2022-07-29 NOTE — PROGRESS NOTES
HEMATOLOGY/MEDICAL ONCOLOGY PROGRESS NOTE    NAME: Gen Flores  : 1970  DATE: 22    PCP: Kamille Peres MD    SUBJECTIVE:  Ms Gen Flores is a 50 y.o. female who was seen for management of her Neutrophilia, Iron Deficiency, and Vitamin D Deficiency. Patient states she completed 8 weeks of weekly Vitamin D supplementation in 2022. She also reports she completed IV Iron in 2021 and tolerated it well. She is here today with her daughters. She denies any fevers, chills, recurrent infections, and skin rash. She denies abdominal pain, nausea, vomiting, diarrhea, or constipation. She denies shortness of breath, dizziness, chest pains, fatigue, weakness, and palpitations. She denies any bleeding. She denies pain or any discomfort. She does not have any concerns or complaints to report at this time. Past Medical History, Surgical History, Family, and Social History reviewed and remain unchanged.     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     GERD (gastroesophageal reflux disease)     reflux    High cholesterol     Migraine     Migraine     Nausea & vomiting     HA    OAB (overactive bladder)     Urinary frequency     Vaginitis and vulvovaginitis     Vitamin D deficiency      Past Surgical History:   Procedure Laterality Date    HX  SECTION      x3    HX HYSTERECTOMY  2021    HX ORTHOPAEDIC  2019    bone graft and plate to right fibia, s/p lower leg fracture    HX ORTHOPAEDIC  2019    jodie in her tibia right( both procedures connected)     Social History     Socioeconomic History    Marital status:    Tobacco Use    Smoking status: Every Day     Packs/day: 0.50     Types: Cigarettes    Smokeless tobacco: Never    Tobacco comments:     less than a half pack per day   Vaping Use    Vaping Use: Never used   Substance and Sexual Activity    Alcohol use: Not Currently     Alcohol/week: 2.0 standard drinks     Types: 2 Glasses of wine per week     Comment: quit in 2006    Drug use: Not Currently    Sexual activity: Not Currently     Family History   Problem Relation Age of Onset    Thyroid Disease Mother     High Cholesterol Mother     Cancer Father        Current Outpatient Medications   Medication Sig Dispense Refill    ferrous sulfate (SLOW FE) 142 mg (45 mg iron) ER tablet Take 1 Tablet by mouth every other day. 45 Tablet 1    ergocalciferol (ERGOCALCIFEROL) 1,250 mcg (50,000 unit) capsule Take 1 Capsule by mouth every seven (7) days. Indications: vitamin D deficiency (high dose therapy) 12 Capsule 0    sertraline (Zoloft) 25 mg tablet Take 25 mg by mouth daily. gabapentin (NEURONTIN) 300 mg capsule Take 300 mg by mouth two (2) times a day. magnesium oxide 250 mg magnesium tablet Take 1 Tablet by mouth daily. (Patient taking differently: Take 400 mg by mouth daily.) 30 Tablet 0    riboflavin, vitamin B2, (Vitamin B-2) 100 mg tablet Take 2 Tablets by mouth two (2) times a day. 60 Tablet 0    melatonin 3 mg cap capsule Take 1 Capsule by mouth nightly as needed (insomnia). 30 Capsule 0    pantoprazole (PROTONIX) 40 mg tablet Take 40 mg by mouth daily. methocarbamoL (Robaxin-750) 750 mg tablet Take 1 Tablet by mouth four (4) times daily. 20 Tablet 0    lidocaine (Lidoderm) 5 % Apply patch to the affected area for 12 hours a day and remove for 12 hours a day. 30 Patch 0    butalbitaL-acetaminophen (PHRENILIN)  mg tablet Take 1 Tab by mouth every six (6) hours as needed. erenumab-aooe (Aimovig Autoinjector, 2 Pack,) 70 mg/mL injection 140 mg by SubCUTAneous route every month. On the 13th of each month       acetaminophen (Tylenol Extra Strength) 500 mg tablet Take 1,000 mg by mouth every six (6) hours as needed for Pain. aspirin/salicylamide/caffeine (BC HEADACHE POWDER PO) Take  by mouth. SUMAtriptan (Imitrex) 100 mg tablet Take 100 mg by mouth once as needed for Migraine. ondansetron hcl (ZOFRAN) 4 mg tablet Take 4 mg by mouth every twelve (12) hours as needed for Nausea. fluticasone (FLONASE) 50 mcg/actuation nasal spray 2 Sprays by Both Nostrils route daily. 1 Bottle 0       No Known Allergies    Review of Systems   Constitutional:  Negative for chills, fever, malaise/fatigue and weight loss. HENT:  Negative for congestion and sore throat. Respiratory:  Negative for shortness of breath. Cardiovascular:  Negative for chest pain and leg swelling. Gastrointestinal:  Negative for abdominal pain, blood in stool, constipation, diarrhea, melena, nausea and vomiting. Genitourinary:  Negative for hematuria. Musculoskeletal:  Negative for myalgias. Skin:  Negative for rash. Neurological:  Negative for dizziness, focal weakness, weakness and headaches. Endo/Heme/Allergies:  Does not bruise/bleed easily. Objective:  Physical Exam:  Visit Vitals  /79   Pulse 89   Temp 98.2 °F (36.8 °C) (Temporal)   Resp 18   Ht 5' 3\" (1.6 m)   Wt 85.1 kg (187 lb 9.6 oz)   LMP 11/09/2012   SpO2 96%   BMI 33.23 kg/m²       General:  Alert, cooperative, no distress, appears stated age. Head:  Normocephalic, without obvious abnormality, atraumatic. Eyes:  Conjunctivae/corneas clear. PERRL, EOMs intact. Throat: Lips, mucosa, and tongue normal.    Neck: Supple, symmetrical, trachea midline, no adenopathy, thyroid: no enlargement/tenderness/nodules   Back:   Symmetric, no curvature. ROM normal. No CVA tenderness. Lungs:   Clear to auscultation bilaterally. Chest wall:  No tenderness or deformity. Heart:  Regular rate and rhythm, S1, S2 normal, no murmur, click, rub or gallop. Abdomen:   Soft, non-tender. Bowel sounds normal. No masses,  No organomegaly.    Extremities: Extremities normal, atraumatic, no cyanosis or edema. Skin: Skin color, texture, turgor normal. No rashes or lesions. Lymph nodes: Cervical, supraclavicular, and axillary nodes normal.   Neurologic: CNII-XII intact. Diagnostic Imaging     Results for orders placed during the hospital encounter of 12/29/21    CT HEAD WO CONT    Narrative  INDICATION: Left-sided numbness in face and upper and lower extremities, onset 1  week ago as intermittent, persistent since 6 PM.  COMPARISON: Most recently 5/3/2021. TECHNIQUE: Axial Head CT without IV contrast. Sagittal and coronal  reconstructions were performed. All CT exams at this facility use one or more dose reduction techniques  including automatic exposure control, mA/kV adjustment per patient's size, or  iterative reconstruction technique. FINDINGS:  Ventricles and sulci are normal in size and configuration. No extra axial  collection. No acute intracranial hemorrhage. No mass effect or edema. No CT  evidence of acute large territorial infarction. Imaged portions of the paranasal sinuses and mastoid air cells are well aerated. Calvarium is unremarkable. Impression  IMPRESSION:  1. No acute intracranial hemorrhage or mass effect. 2.  ASPECT score 10. Lab Results  Lab Results   Component Value Date/Time    WBC 17.7 (H) 07/26/2022 12:02 PM    HGB 13.8 07/26/2022 12:02 PM    HCT 42.9 07/26/2022 12:02 PM    PLATELET 964 19/99/8570 12:02 PM    MCV 93.7 07/26/2022 12:02 PM       Lab Results   Component Value Date/Time    Sodium 142 07/26/2022 12:02 PM    Potassium 4.3 07/26/2022 12:02 PM    Chloride 106 07/26/2022 12:02 PM    CO2 30 07/26/2022 12:02 PM    Anion gap 6 07/26/2022 12:02 PM    Glucose 78 07/26/2022 12:02 PM    BUN 19 (H) 07/26/2022 12:02 PM    Creatinine 0.72 07/26/2022 12:02 PM    BUN/Creatinine ratio 26 (H) 07/26/2022 12:02 PM    GFR est AA >60 07/26/2022 12:02 PM    GFR est non-AA >60 07/26/2022 12:02 PM    Calcium 9.2 07/26/2022 12:02 PM    Alk.  phosphatase 141 (H) 2022 12:02 PM    Protein, total 6.6 2022 12:02 PM    Albumin 3.7 2022 12:02 PM    Globulin 2.9 2022 12:02 PM    A-G Ratio 1.3 2022 12:02 PM    ALT (SGPT) 22 2022 12:02 PM     Lab Results   Component Value Date/Time    Iron 53 2022 12:02 PM    TIBC 290 2022 12:02 PM    Iron % saturation 18 (L) 2022 12:02 PM    Ferritin 205 2022 12:02 PM     Lab Results   Component Value Date/Time    Vitamin B12 1,800 (H) 2022 12:02 PM    Folate 5.1 2022 12:02 PM     Lab Results   Component Value Date/Time    Vitamin D 25-Hydroxy 24.5 (L) 2022 12:02 PM         Assessment/Plan  Neutrophilia  --Patient denies repeated infections  --She smokes 1/2-1 ppd  --Her father  from AML.  --JAK2, BCR-ABL, MPL, and CALR were all negative. --Neutrophilia is likely from cigarette smoking  --Myeloproliferative neoplasm work up -negative  --Advised patient to stop smoking    Tobacco Abuse  --Tobacco cessation counseling completed    Thrombocytosis  --MPN panel negative  --2022 Platelet normal     Iron Deficiency Anemia  --2022: CBC showed WBC 17.7K/uL, hemoglobin 13.8/dL with hematocrit of 42.9%. Platelet 482. BUN 19 and Creatinine normal. Iron Sat 18%. Vit B12 1800. Normal Folate. TSH normal  --Patient completed IV Iron in the form of Injecatfer x 2 in 2021. --Completed hysterectomy in 2021  --Follow with GI for Colonoscopy  --Will repeat labs in 3 months. Vitamin D Deficiency  --2022- Vitamin D low at 24.5  --Rx Ergocalciferol 50,000units weekly x 8 doses sent to her pharmacy. Patient states she will start right away. --We will continue to monitor  --Repeat lab in 3 months if normal will defer to PCP. Patient verbalized understanding. Follow up in 3 months with repeat labs or sooner if indicated.     Orders Placed This Encounter    CBC WITH AUTOMATED DIFF     Standing Status:   Future     Standing Expiration Date:   2023 FERRITIN     Standing Status:   Future     Standing Expiration Date:   7/30/2023    IRON PROFILE     Standing Status:   Future     Standing Expiration Date:   0/50/7336    METABOLIC PANEL, COMPREHENSIVE     Standing Status:   Future     Standing Expiration Date:   7/30/2023    VITAMIN B12 & FOLATE     Standing Status:   Future     Standing Expiration Date:   7/30/2023    VITAMIN D, 25 HYDROXY     Standing Status:   Future     Standing Expiration Date:   7/30/2023    ferrous sulfate (SLOW FE) 142 mg (45 mg iron) ER tablet     Sig: Take 1 Tablet by mouth every other day. Dispense:  45 Tablet     Refill:  1    ergocalciferol (ERGOCALCIFEROL) 1,250 mcg (50,000 unit) capsule     Sig: Take 1 Capsule by mouth every seven (7) days.  Indications: vitamin D deficiency (high dose therapy)     Dispense:  12 Capsule     Refill:  0       Jenifer Veliz DNP, FNP-C  07/29/22      CC: Dominic Pradhan MD

## 2022-07-30 LAB
GLIADIN PEPTIDE IGA SER-ACNC: 2 UNITS (ref 0–19)
GLIADIN PEPTIDE IGG SER-ACNC: 1 UNITS (ref 0–19)
IGA SERPL-MCNC: 137 MG/DL (ref 87–352)
TTG IGA SER-ACNC: <2 U/ML (ref 0–3)
TTG IGG SER-ACNC: <2 U/ML (ref 0–5)

## 2022-10-17 ENCOUNTER — HOSPITAL ENCOUNTER (OUTPATIENT)
Dept: INFUSION THERAPY | Age: 52
Discharge: HOME | End: 2022-10-17
Payer: COMMERCIAL

## 2022-10-17 VITALS — TEMPERATURE: 98 F

## 2022-10-17 DIAGNOSIS — D50.8 IRON DEFICIENCY ANEMIA SECONDARY TO INADEQUATE DIETARY IRON INTAKE: ICD-10-CM

## 2022-10-17 DIAGNOSIS — E55.9 VITAMIN D DEFICIENCY: ICD-10-CM

## 2022-10-17 DIAGNOSIS — E53.8 VITAMIN B12 DEFICIENCY: ICD-10-CM

## 2022-10-17 LAB
25(OH)D3 SERPL-MCNC: 46.4 NG/ML (ref 30–100)
ALBUMIN SERPL-MCNC: 3.8 G/DL (ref 3.4–5)
ALBUMIN/GLOB SERPL: 1.2 {RATIO} (ref 0.8–1.7)
ALP SERPL-CCNC: 162 U/L (ref 45–117)
ALT SERPL-CCNC: 51 U/L (ref 13–56)
ANION GAP SERPL CALC-SCNC: 6 MMOL/L (ref 3–18)
AST SERPL-CCNC: 27 U/L (ref 10–38)
BASO+EOS+MONOS # BLD AUTO: 0.7 K/UL (ref 0–2.3)
BASO+EOS+MONOS NFR BLD AUTO: 5 % (ref 0.1–17)
BILIRUB SERPL-MCNC: 0.2 MG/DL (ref 0.2–1)
BUN SERPL-MCNC: 12 MG/DL (ref 7–18)
BUN/CREAT SERPL: 16 (ref 12–20)
CALCIUM SERPL-MCNC: 9.8 MG/DL (ref 8.5–10.1)
CHLORIDE SERPL-SCNC: 108 MMOL/L (ref 100–111)
CO2 SERPL-SCNC: 27 MMOL/L (ref 21–32)
CREAT SERPL-MCNC: 0.75 MG/DL (ref 0.6–1.3)
DIFFERENTIAL METHOD BLD: ABNORMAL
ERYTHROCYTE [DISTWIDTH] IN BLOOD BY AUTOMATED COUNT: 12.7 % (ref 11.5–14.5)
FERRITIN SERPL-MCNC: 207 NG/ML (ref 8–388)
FOLATE SERPL-MCNC: 4.6 NG/ML (ref 3.1–17.5)
GLOBULIN SER CALC-MCNC: 3.1 G/DL (ref 2–4)
GLUCOSE SERPL-MCNC: 93 MG/DL (ref 74–99)
HCT VFR BLD AUTO: 46.1 % (ref 36–48)
HGB BLD-MCNC: 14.7 G/DL (ref 12–16)
IRON SATN MFR SERPL: 29 % (ref 20–50)
IRON SERPL-MCNC: 91 UG/DL (ref 50–175)
LYMPHOCYTES # BLD: 2.7 K/UL (ref 1.1–5.9)
LYMPHOCYTES NFR BLD: 20 % (ref 14–44)
MCH RBC QN AUTO: 30.3 PG (ref 25–35)
MCHC RBC AUTO-ENTMCNC: 31.9 G/DL (ref 31–37)
MCV RBC AUTO: 95.1 FL (ref 78–102)
NEUTS SEG # BLD: 10.3 K/UL (ref 1.8–9.5)
NEUTS SEG NFR BLD: 75 % (ref 40–70)
PLATELET # BLD AUTO: 349 K/UL (ref 140–440)
POTASSIUM SERPL-SCNC: 4.5 MMOL/L (ref 3.5–5.5)
PROT SERPL-MCNC: 6.9 G/DL (ref 6.4–8.2)
RBC # BLD AUTO: 4.85 M/UL (ref 4.1–5.1)
SODIUM SERPL-SCNC: 141 MMOL/L (ref 136–145)
TIBC SERPL-MCNC: 319 UG/DL (ref 250–450)
VIT B12 SERPL-MCNC: 1415 PG/ML (ref 211–911)
WBC # BLD AUTO: 13.7 K/UL (ref 4.5–13)

## 2022-10-17 PROCEDURE — 82607 VITAMIN B-12: CPT

## 2022-10-17 PROCEDURE — 82728 ASSAY OF FERRITIN: CPT

## 2022-10-17 PROCEDURE — 85025 COMPLETE CBC W/AUTO DIFF WBC: CPT

## 2022-10-17 PROCEDURE — 36415 COLL VENOUS BLD VENIPUNCTURE: CPT

## 2022-10-17 PROCEDURE — 82306 VITAMIN D 25 HYDROXY: CPT

## 2022-10-17 PROCEDURE — 83540 ASSAY OF IRON: CPT

## 2022-10-17 PROCEDURE — 80053 COMPREHEN METABOLIC PANEL: CPT

## 2022-10-17 NOTE — PROGRESS NOTES
[facility] Progress Note    Date: 2022    Name: Seng Weinberg    MRN: [id number]         : 1970    Peripheral Lab Draw      Ms. Rosa Matthews to [address] [place] [address], ambulatory at 1039 accompanied by self. Pt was assessed and education was provided. Ms. Radha Rico vitals were reviewed and patient was observed for 5 minutes prior to treatment. Visit Vitals  Temp 98 °F (36.7 °C)     Recent Results (from the past 12 hour(s))   CBC WITH 3 PART DIFF    Collection Time: 10/17/22  9:40 AM   Result Value Ref Range    WBC 13.7 (H) 4.5 - 13.0 K/uL    RBC 4.85 4.10 - 5.10 M/uL    HGB 14.7 12.0 - 16.0 g/dL    HCT 46.1 36 - 48 %    MCV 95.1 78 - 102 FL    MCH 30.3 25.0 - 35.0 PG    MCHC 31.9 31 - 37 g/dL    RDW 12.7 11.5 - 14.5 %    PLATELET 789 151 - 658 K/uL    NEUTROPHILS 75 (H) 40 - 70 %    Mixed cells 5 0.1 - 17 %    LYMPHOCYTES 20 14 - 44 %    ABS. NEUTROPHILS 10.3 (H) 1.8 - 9.5 K/UL    ABS. MIXED CELLS 0.7 0.0 - 2.3 K/uL    ABS. LYMPHOCYTES 2.7 1.1 - 5.9 K/UL    DF AUTOMATED         Blood obtained peripherally from left arm x  attempt with butterfly needle and sent to lab per written orders. No bleeding or hematoma noted at site. Gauze and coban applied. Ms. Rosa Matthews tolerated the phlebotomy, and had no complaints. Patient armband removed and shredded. Ms. Rosa Matthews was discharged from [place] [address] in stable condition at [id number] [phone].      Lindsey Thompson Phlebotomist PCT  2022  11:02 AM

## 2022-10-28 ENCOUNTER — VIRTUAL VISIT (OUTPATIENT)
Age: 52
End: 2022-10-28
Payer: COMMERCIAL

## 2022-10-28 DIAGNOSIS — E53.8 VITAMIN B12 DEFICIENCY: ICD-10-CM

## 2022-10-28 DIAGNOSIS — D50.8 IRON DEFICIENCY ANEMIA SECONDARY TO INADEQUATE DIETARY IRON INTAKE: Primary | ICD-10-CM

## 2022-10-28 PROCEDURE — 99442 PR PHYS/QHP TELEPHONE EVALUATION 11-20 MIN: CPT | Performed by: NURSE PRACTITIONER

## 2022-10-28 NOTE — PROGRESS NOTES
Shannon Solo is a 50 y.o. female, evaluated via audio-only technology on 10/28/2022 for Iron Deficiency Anemia. PCP: [facility]    Assessment & Plan:   Neutrophilia  --10/17/2022: CBC showed WBC 13.7, hemoglobin 14.7g/dL with hematocrit of 46.1%. Platelet 524  --Patient denies repeated infections  --She smokes 1/2-1 ppd  --Her father  from AML.  --JAK2, BCR-ABL, MPL, and CALR were all negative. --Neutrophilia is likely from cigarette smoking  --Myeloproliferative neoplasm work up -negative  --Advised patient to stop smoking     Tobacco Abuse  --Tobacco cessation counseling completed     Thrombocytosis  --MPN panel negative  --10/17/2022 Platelet normal     Iron Deficiency Anemia  --10/17/2022: CBC showed WBC 13.7, hemoglobin 14.7g/dL with hematocrit of 46.1%. Platelet 710. BUN and Creatinine normal. Iron Sat 29%. Vit B12 1415. Normal Folate. TSH normal  --Patient completed IV Iron in the form of Injecatfer x 2 in 2021. --Completed hysterectomy in 2021  --Follow up with GI for Colonoscopy  --Will repeat labs in 3 months. Vitamin D Deficiency  --2022- Vitamin D low at 24.5  --Rx Ergocalciferol 50,000units weekly x 8 doses sent to her pharmacy. --10/17/2022 Vit D normal at 46.4  --Defer to PCP. Patient agreed with plan. Subjective:   Ms. Shannon Solo is a 50 y.o. female who was seen for management of her Neutrophilia, Iron Deficiency, and Vitamin D Deficiency. Patient states she completed 8 weeks of weekly Vitamin D supplementation in 2022. She complains of aches and pains all over her body. She states she has an appointment coming up with her PCP. She also reports she completed IV Iron in 2021 and tolerated it well. She denies any fevers, chills, recurrent infections, and skin rash. She denies abdominal pain, nausea, vomiting, diarrhea, or constipation. She denies shortness of breath, dizziness, chest pains, fatigue, weakness, and palpitations.  She denies any bleeding. Prior to Admission medications    Medication Sig Start Date End Date Taking? Authorizing Provider   dicyclomine (BENTYL) 20 mg tablet Take 1 Tablet by mouth every six (6) hours as needed for Abdominal Cramps. 10/1/22   Keri Castañeda DO   ondansetron (ZOFRAN ODT) 4 mg disintegrating tablet Take 1 Tablet by mouth every eight (8) hours as needed for Nausea or Vomiting. 10/1/22   Keri Castañeda DO   methocarbamoL (Robaxin-750) 750 mg tablet Take 1 Tablet by mouth four (4) times daily as needed for Muscle Spasm(s). 9/26/22   Ava Morfin MD   promethazine (PHENERGAN) 25 mg tablet Take 1 Tablet by mouth every six (6) hours as needed (nausea and/or abdominal cramping). 9/26/22   Ava Morfin MD   naproxen (NAPROSYN) 375 mg tablet Take 1 Tablet by mouth two (2) times daily (with meals). Indications: pain 9/26/22   Ava Morfin MD   ergocalciferol (ERGOCALCIFEROL) 1,250 mcg (50,000 unit) capsule Take 1 Capsule by mouth every seven (7) days. Indications: vitamin D deficiency (high dose therapy) 7/29/22   Sergio Rivera DNP   ferrous sulfate (SLOW FE) 142 mg (45 mg iron) ER tablet Take 1 Tablet by mouth every other day. 7/29/22   Brenda Kitchen DNP   sertraline (Zoloft) 25 mg tablet Take 25 mg by mouth daily. Vincent Fletcher MD   gabapentin (NEURONTIN) 300 mg capsule Take 300 mg by mouth two (2) times a day. Vincent Fletcher MD   magnesium oxide 250 mg magnesium tablet Take 1 Tablet by mouth daily. Patient taking differently: Take 400 mg by mouth daily. 12/30/21   Tonya Truong MD   riboflavin, vitamin B2, (Vitamin B-2) 100 mg tablet Take 2 Tablets by mouth two (2) times a day. 12/30/21   Tonya Truong MD   melatonin 3 mg cap capsule Take 1 Capsule by mouth nightly as needed (insomnia). 12/30/21   Tonya Truong MD   pantoprazole (PROTONIX) 40 mg tablet Take 40 mg by mouth daily.     Other, MD Vincent   lidocaine (Lidoderm) 5 % Apply patch to the affected area for 12 hours a day and remove for 12 hours a day. 21   Jae Steven PA-C   erenumab-aooe (Aimovig Autoinjector, 2 Pack,) 70 mg/mL injection 140 mg by SubCUTAneous route every month. On the  of each month     Provider, Historical   aspirin/salicylamide/caffeine (BC HEADACHE POWDER PO) Take  by mouth. Provider, Historical   SUMAtriptan (Imitrex) 100 mg tablet Take 100 mg by mouth once as needed for Migraine. Provider, Historical   fluticasone (FLONASE) 50 mcg/actuation nasal spray 2 Sprays by Both Nostrils route daily. 18   Angel Luis Tarango NP     Patient Active Problem List   Diagnosis Code    Depression F32. A    Urinary frequency R35.0    Nocturia R35.1    Pelvic pain affecting pregnancy O26.899, R10.2    Vaginitis N76.0    Incomplete emptying of bladder R33.9    Open fracture of right fibula and tibia S82.201B, S82.401B    Open fracture of right tibia and fibula S82.201B, S82.401B    Neutrophilia D72.9    Tobacco abuse Z72.0    Iron deficiency anemia D50.9    Thrombocytosis D75.839    Abdominal pain R10.9    Anemia D64.9    Anxiety state F41.1    Chronic fatigue syndrome G93.32    Chronic migraine without aura without status migrainosus, not intractable G43.709    Cyst of ovary N83.209    Dysfunctional uterine bleeding N93.8    Dyspareunia [id number]    Female genital symptoms N94.9    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.6    General symptom R68.89    Mittelschmerz N94.0    Occipital neuralgia of left side M54.81    Pregnancy, delivered O80    Premenstrual tension syndrome N94.3    Uterine scar from previous  delivery, with delivery O34.219    Vitamin D deficiency E55.9    Pelvic pain R10.2    Left facial numbness R20.0    Left upper extremity numbness R20.0    History of migraine headaches Z86.69    History of multiple concussions Z87.820    Numbness and tingling of left lower extremity R20.0, R20.2     Patient Active Problem List    Diagnosis Date Noted    Left facial numbness 2021    Left upper extremity numbness 2021    History of migraine headaches 2021    History of multiple concussions 2021    Numbness and tingling of left lower extremity 2021    Pelvic pain 2021    Abdominal pain 2021    Anemia 2021    Anxiety state 2021    Chronic fatigue syndrome 2021    Cyst of ovary 2021    Dysfunctional uterine bleeding 2021    Dyspareunia 2021    Female genital symptoms 2021    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 2021    General symptom 2021    Mittelschmerz 2021    Pregnancy, delivered 2021    Premenstrual tension syndrome 2021    Uterine scar from previous  delivery, with delivery 2021    Vitamin D deficiency 2021    Thrombocytosis 2021    Iron deficiency anemia 2021    Neutrophilia 2021    Tobacco abuse 2021    Open fracture of right fibula and tibia 2019    Open fracture of right tibia and fibula 2019    Chronic migraine without aura without status migrainosus, not intractable 2015    Occipital neuralgia of left side 2015    Urinary frequency 2015    Nocturia 2015    Pelvic pain affecting pregnancy 2015    Vaginitis 2015    Incomplete emptying of bladder 2015    Depression 2012     Current Outpatient Medications   Medication Sig Dispense Refill    dicyclomine (BENTYL) 20 mg tablet Take 1 Tablet by mouth every six (6) hours as needed for Abdominal Cramps. 30 Tablet 0    ondansetron (ZOFRAN ODT) 4 mg disintegrating tablet Take 1 Tablet by mouth every eight (8) hours as needed for Nausea or Vomiting. 12 Tablet 0    methocarbamoL (Robaxin-750) 750 mg tablet Take 1 Tablet by mouth four (4) times daily as needed for Muscle Spasm(s). 24 Tablet 0    promethazine (PHENERGAN) 25 mg tablet Take 1 Tablet by mouth every six (6) hours as needed (nausea and/or abdominal cramping).  15 Tablet 0    naproxen (NAPROSYN) 375 mg tablet Take 1 Tablet by mouth two (2) times daily (with meals). Indications: pain 14 Tablet 0    ergocalciferol (ERGOCALCIFEROL) 1,250 mcg (50,000 unit) capsule Take 1 Capsule by mouth every seven (7) days. Indications: vitamin D deficiency (high dose therapy) 12 Capsule 0    ferrous sulfate (SLOW FE) 142 mg (45 mg iron) ER tablet Take 1 Tablet by mouth every other day. 45 Tablet 1    sertraline (Zoloft) 25 mg tablet Take 25 mg by mouth daily. gabapentin (NEURONTIN) 300 mg capsule Take 300 mg by mouth two (2) times a day. magnesium oxide 250 mg magnesium tablet Take 1 Tablet by mouth daily. (Patient taking differently: Take 400 mg by mouth daily.) 30 Tablet 0    riboflavin, vitamin B2, (Vitamin B-2) 100 mg tablet Take 2 Tablets by mouth two (2) times a day. 60 Tablet 0    melatonin 3 mg cap capsule Take 1 Capsule by mouth nightly as needed (insomnia). 30 Capsule 0    pantoprazole (PROTONIX) 40 mg tablet Take 40 mg by mouth daily. lidocaine (Lidoderm) 5 % Apply patch to the affected area for 12 hours a day and remove for 12 hours a day. 30 Patch 0    erenumab-aooe (Aimovig Autoinjector, 2 Pack,) 70 mg/mL injection 140 mg by SubCUTAneous route every month. On the 13th of each month       aspirin/salicylamide/caffeine (BC HEADACHE POWDER PO) Take  by mouth. SUMAtriptan (Imitrex) 100 mg tablet Take 100 mg by mouth once as needed for Migraine. fluticasone (FLONASE) 50 mcg/actuation nasal spray 2 Sprays by Both Nostrils route daily.  1 Bottle 0     No Known Allergies  Past Medical History:   Diagnosis Date    Anemia     Bladder adhesions     Concussion 05/20/2018    Cyst of ovary     Dysfunctional uterine bleeding     Dysmenorrhea     Female pelvic peritoneal adhesion     GERD (gastroesophageal reflux disease)     reflux    High cholesterol     Migraine     Migraine     Nausea & vomiting     HA    OAB (overactive bladder)     Urinary frequency     Vaginitis and vulvovaginitis     Vitamin D deficiency      Past Surgical History:   Procedure Laterality Date    HX  SECTION      x3    HX HYSTERECTOMY  2021    HX ORTHOPAEDIC  2019    bone graft and plate to right fibia, s/p lower leg fracture    HX ORTHOPAEDIC  2019    jodie in her tibia right( both procedures connected)     Family History   Problem Relation Age of Onset    Thyroid Disease Mother     High Cholesterol Mother     Cancer Father      Social History     Tobacco Use    Smoking status: Every Day     Packs/day: 0.50     Types: Cigarettes    Smokeless tobacco: Never    Tobacco comments:     less than a half pack per day   Substance Use Topics    Alcohol use: Not Currently     Alcohol/week: 2.0 standard drinks     Types: 2 Glasses of wine per week     Comment: quit in        Review of Systems   Constitutional:  Negative for chills, fever and malaise/fatigue. HENT:  Negative for congestion and sore throat. Respiratory:  Negative for cough and shortness of breath. Cardiovascular:  Negative for chest pain and leg swelling. Gastrointestinal:  Negative for abdominal pain, blood in stool, constipation, diarrhea, melena, nausea and vomiting. Genitourinary:  Negative for hematuria. Musculoskeletal:  Negative for myalgias. Skin:  Negative for rash. Neurological:  Negative for dizziness, weakness and headaches. Endo/Heme/Allergies:  Does not bruise/bleed easily. Patient-Reported Weight: [id number] was evaluated through a patient-initiated, synchronous (real-time) audio only encounter. She (or guardian if applicable) is aware that it is a billable service, which includes applicable co-pays, with coverage as determined by her insurance carrier. This visit was conducted with the patient's (and/or Verlie Caller guardian's) verbal consent. She has not had a related appointment within my department in the past 7 days or scheduled within the next 24 hours.  The patient was located in a state where the provider was licensed to provide care. The patient was located at: Home: [address]  The provider was located at: [facility] Department[facility] [address] [facility]    Total Time: minutes: 19    Follow up in 6 months with repeat labs or sooner if indicated.     Orders Placed This Encounter    CBC WITH AUTOMATED DIFF     Standing Status:   Future     Standing Expiration Date:   47/58/6765    METABOLIC PANEL, COMPREHENSIVE     Standing Status:   Future     Standing Expiration Date:   10/29/2023    FERRITIN     Standing Status:   Future     Standing Expiration Date:   10/28/2023    IRON PROFILE     Standing Status:   Future     Standing Expiration Date:   10/29/2023    VITAMIN B12 & FOLATE     Standing Status:   Future     Standing Expiration Date:   10/28/2023       Fan Atkins DNP, FNP-C  10/28/22

## 2023-01-09 ENCOUNTER — TELEPHONE (OUTPATIENT)
Age: 53
End: 2023-01-09

## 2023-01-09 NOTE — TELEPHONE ENCOUNTER
Left message on voicemail to have patient contact our office, reference to our office has closed; would like to know if she would like to transfer her care to our [facility] location in [place]

## 2023-02-04 DIAGNOSIS — D50.8 IRON DEFICIENCY ANEMIA SECONDARY TO INADEQUATE DIETARY IRON INTAKE: Primary | ICD-10-CM

## 2023-02-05 DIAGNOSIS — D50.8 IRON DEFICIENCY ANEMIA SECONDARY TO INADEQUATE DIETARY IRON INTAKE: Primary | ICD-10-CM

## 2023-02-06 DIAGNOSIS — E53.8 VITAMIN B12 DEFICIENCY: Primary | ICD-10-CM

## 2023-04-23 ENCOUNTER — HOSPITAL ENCOUNTER (EMERGENCY)
Facility: HOSPITAL | Age: 53
Discharge: HOME | End: 2023-04-24
Attending: EMERGENCY MEDICINE
Payer: MEDICAID

## 2023-04-23 ENCOUNTER — APPOINTMENT (OUTPATIENT)
Facility: HOSPITAL | Age: 53
End: 2023-04-23
Payer: MEDICAID

## 2023-04-23 VITALS
TEMPERATURE: 97.5 F | DIASTOLIC BLOOD PRESSURE: 63 MMHG | WEIGHT: 185 LBS | RESPIRATION RATE: 24 BRPM | BODY MASS INDEX: 32.78 KG/M2 | OXYGEN SATURATION: 91 % | HEIGHT: 63 IN | HEART RATE: 76 BPM | SYSTOLIC BLOOD PRESSURE: 118 MMHG

## 2023-04-23 DIAGNOSIS — R07.9 CHEST PAIN, UNSPECIFIED TYPE: Primary | ICD-10-CM

## 2023-04-23 LAB
ALBUMIN SERPL-MCNC: 3.5 G/DL (ref 3.4–5)
ALBUMIN/GLOB SERPL: 1.3 (ref 0.8–1.7)
ALP SERPL-CCNC: 147 U/L (ref 45–117)
ALT SERPL-CCNC: 38 U/L (ref 13–56)
ANION GAP SERPL CALC-SCNC: 2 MMOL/L (ref 3–18)
APAP SERPL-MCNC: 16 UG/ML (ref 10–30)
APTT PPP: 30.5 SEC (ref 23–36.4)
AST SERPL-CCNC: 22 U/L (ref 10–38)
BASOPHILS # BLD: 0.1 K/UL (ref 0–0.1)
BASOPHILS NFR BLD: 0 % (ref 0–2)
BILIRUB SERPL-MCNC: 0.3 MG/DL (ref 0.2–1)
BUN SERPL-MCNC: 8 MG/DL (ref 7–18)
BUN/CREAT SERPL: 11 (ref 12–20)
CALCIUM SERPL-MCNC: 9.2 MG/DL (ref 8.5–10.1)
CHLORIDE SERPL-SCNC: 105 MMOL/L (ref 100–111)
CO2 SERPL-SCNC: 29 MMOL/L (ref 21–32)
CREAT SERPL-MCNC: 0.73 MG/DL (ref 0.6–1.3)
DIFFERENTIAL METHOD BLD: ABNORMAL
EKG ATRIAL RATE: 77 BPM
EKG DIAGNOSIS: NORMAL
EKG P AXIS: 53 DEGREES
EKG P-R INTERVAL: 142 MS
EKG Q-T INTERVAL: 370 MS
EKG QRS DURATION: 74 MS
EKG QTC CALCULATION (BAZETT): 418 MS
EKG R AXIS: 32 DEGREES
EKG T AXIS: 30 DEGREES
EKG VENTRICULAR RATE: 77 BPM
EOSINOPHIL # BLD: 0.2 K/UL (ref 0–0.4)
EOSINOPHIL NFR BLD: 1 % (ref 0–5)
ERYTHROCYTE [DISTWIDTH] IN BLOOD BY AUTOMATED COUNT: 13.6 % (ref 11.6–14.5)
GLOBULIN SER CALC-MCNC: 2.8 G/DL (ref 2–4)
GLUCOSE SERPL-MCNC: 82 MG/DL (ref 74–99)
HCT VFR BLD AUTO: 39 % (ref 35–45)
HGB BLD-MCNC: 13 G/DL (ref 12–16)
IMM GRANULOCYTES # BLD AUTO: 0.1 K/UL (ref 0–0.04)
IMM GRANULOCYTES NFR BLD AUTO: 1 % (ref 0–0.5)
INR PPP: 0.8 (ref 0.8–1.2)
LYMPHOCYTES # BLD: 2.3 K/UL (ref 0.9–3.6)
LYMPHOCYTES NFR BLD: 15 % (ref 21–52)
MCH RBC QN AUTO: 30.3 PG (ref 24–34)
MCHC RBC AUTO-ENTMCNC: 33.3 G/DL (ref 31–37)
MCV RBC AUTO: 90.9 FL (ref 78–100)
MONOCYTES # BLD: 0.8 K/UL (ref 0.05–1.2)
MONOCYTES NFR BLD: 6 % (ref 3–10)
NEUTS SEG # BLD: 11.9 K/UL (ref 1.8–8)
NEUTS SEG NFR BLD: 77 % (ref 40–73)
NRBC # BLD: 0 K/UL (ref 0–0.01)
NRBC BLD-RTO: 0 PER 100 WBC
NT PRO BNP: 130 PG/ML (ref 0–900)
PLATELET # BLD AUTO: 403 K/UL (ref 135–420)
PMV BLD AUTO: 9.9 FL (ref 9.2–11.8)
POTASSIUM SERPL-SCNC: 4 MMOL/L (ref 3.5–5.5)
PROT SERPL-MCNC: 6.3 G/DL (ref 6.4–8.2)
PROTHROMBIN TIME: 11.8 SEC (ref 11.5–15.2)
RBC # BLD AUTO: 4.29 M/UL (ref 4.2–5.3)
SALICYLATES SERPL-MCNC: 10.2 MG/DL (ref 2.8–20)
SODIUM SERPL-SCNC: 136 MMOL/L (ref 136–145)
TROPONIN I SERPL HS-MCNC: 3 NG/L (ref 0–54)
WBC # BLD AUTO: 15.4 K/UL (ref 4.6–13.2)

## 2023-04-23 PROCEDURE — 84484 ASSAY OF TROPONIN QUANT: CPT

## 2023-04-23 PROCEDURE — 85730 THROMBOPLASTIN TIME PARTIAL: CPT

## 2023-04-23 PROCEDURE — 93005 ELECTROCARDIOGRAM TRACING: CPT | Performed by: EMERGENCY MEDICINE

## 2023-04-23 PROCEDURE — 71045 X-RAY EXAM CHEST 1 VIEW: CPT

## 2023-04-23 PROCEDURE — 83880 ASSAY OF NATRIURETIC PEPTIDE: CPT

## 2023-04-23 PROCEDURE — 80143 DRUG ASSAY ACETAMINOPHEN: CPT

## 2023-04-23 PROCEDURE — 99285 EMERGENCY DEPT VISIT HI MDM: CPT

## 2023-04-23 PROCEDURE — 85610 PROTHROMBIN TIME: CPT

## 2023-04-23 PROCEDURE — 85025 COMPLETE CBC W/AUTO DIFF WBC: CPT

## 2023-04-23 PROCEDURE — 93010 ELECTROCARDIOGRAM REPORT: CPT | Performed by: INTERNAL MEDICINE

## 2023-04-23 PROCEDURE — 80053 COMPREHEN METABOLIC PANEL: CPT

## 2023-04-23 PROCEDURE — 80179 DRUG ASSAY SALICYLATE: CPT

## 2023-04-23 ASSESSMENT — PAIN - FUNCTIONAL ASSESSMENT: PAIN_FUNCTIONAL_ASSESSMENT: 0-10

## 2023-04-23 ASSESSMENT — PAIN SCALES - GENERAL: PAINLEVEL_OUTOF10: 0

## 2023-04-23 ASSESSMENT — PAIN DESCRIPTION - LOCATION: LOCATION: CHEST

## 2023-04-23 ASSESSMENT — PAIN DESCRIPTION - DESCRIPTORS: DESCRIPTORS: TIGHTNESS

## 2023-04-23 ASSESSMENT — PAIN DESCRIPTION - ORIENTATION: ORIENTATION: RIGHT

## 2023-04-24 LAB — TROPONIN I SERPL HS-MCNC: 4 NG/L (ref 0–54)

## 2023-04-24 NOTE — ED NOTES
Discharge instruction reviewed with patient and son. No further questions at this time.      Karoline Reeves RN  04/24/23 [id number]

## 2023-04-24 NOTE — ED PROVIDER NOTES
EMERGENCY DEPARTMENT HISTORY AND PHYSICAL EXAM      Patient Name: Caro Cadena  MRN: [id number]  YOB: 1970  Provider: Pamella Gay MD  PCP: Mohsen Luna MD   Time/Date of evaluation: 8:45 PM EDT on 23    History of Presenting Illness     Chief Complaint   Patient presents with    Chest Pain    Shortness of Breath       History Provided By: Patient     History Susy Tolentino): Caro Cadena is a 50 y.o. female with a PMHX of concussions, headaches, GERD, and hyperlipidemia,  who presents to the emergency department  by EMS C/O chest pain and shortness of breath. She states that she was at another person's home picking up furniture. During that encounter, she felt like the other person's voice started to fade away and she had decreased hearing.   After that she had pressure and pain that went from the left side to the right side of her chest.                Past History     Past Medical History:  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     GERD (gastroesophageal reflux disease)     reflux    High cholesterol     Migraine     Migraine     Nausea & vomiting     HA    OAB (overactive bladder)     Urinary frequency     Vaginitis and vulvovaginitis     Vitamin D deficiency        Past Surgical History:  Past Surgical History:   Procedure Laterality Date     SECTION      x3    HYSTERECTOMY (CERVIX STATUS UNKNOWN)  2021    ORTHOPEDIC SURGERY  2019    kristen in her tibia right( both procedures connected)    ORTHOPEDIC SURGERY  2019    bone graft and plate to right fibia, s/p lower leg fracture       Family History:  Family History   Problem Relation Age of Onset    Cancer Father     Thyroid Disease Mother     High Cholesterol Mother        Social History:  Social History     Tobacco Use    Smoking status: Every Day
